# Patient Record
Sex: FEMALE | Race: WHITE | NOT HISPANIC OR LATINO | Employment: OTHER | URBAN - NONMETROPOLITAN AREA
[De-identification: names, ages, dates, MRNs, and addresses within clinical notes are randomized per-mention and may not be internally consistent; named-entity substitution may affect disease eponyms.]

---

## 2023-04-21 LAB
CHOLESTEROL (MG/DL) IN SER/PLAS: 208 MG/DL (ref 0–199)
CHOLESTEROL IN HDL (MG/DL) IN SER/PLAS: 87.4 MG/DL
CHOLESTEROL/HDL RATIO: 2.4
LDL: 108 MG/DL (ref 0–99)
TRIGLYCERIDE (MG/DL) IN SER/PLAS: 63 MG/DL (ref 0–149)
VLDL: 13 MG/DL (ref 0–40)

## 2023-04-28 PROBLEM — K58.9 IRRITABLE BOWEL SYNDROME: Status: ACTIVE | Noted: 2023-04-28

## 2023-04-28 PROBLEM — I10 HYPERTENSION: Status: ACTIVE | Noted: 2023-04-28

## 2023-04-28 PROBLEM — G47.00 PERSISTENT INSOMNIA: Status: ACTIVE | Noted: 2023-04-28

## 2023-04-28 PROBLEM — I47.10 PSVT (PAROXYSMAL SUPRAVENTRICULAR TACHYCARDIA) (CMS-HCC): Status: ACTIVE | Noted: 2023-04-28

## 2023-04-28 PROBLEM — I34.1 MITRAL VALVE PROLAPSE: Status: ACTIVE | Noted: 2023-04-28

## 2023-04-28 PROBLEM — M81.0 OSTEOPOROSIS: Status: ACTIVE | Noted: 2023-04-28

## 2023-04-28 PROBLEM — D72.819 LEUKOPENIA: Status: ACTIVE | Noted: 2023-04-28

## 2023-04-28 RX ORDER — VALSARTAN 160 MG/1
1 TABLET ORAL DAILY
COMMUNITY
Start: 2017-09-26 | End: 2023-05-01 | Stop reason: SDUPTHER

## 2023-05-01 ENCOUNTER — OFFICE VISIT (OUTPATIENT)
Dept: PRIMARY CARE | Facility: CLINIC | Age: 68
End: 2023-05-01
Payer: MEDICARE

## 2023-05-01 VITALS
TEMPERATURE: 97.2 F | HEART RATE: 72 BPM | DIASTOLIC BLOOD PRESSURE: 78 MMHG | BODY MASS INDEX: 19.94 KG/M2 | OXYGEN SATURATION: 97 % | WEIGHT: 110.8 LBS | SYSTOLIC BLOOD PRESSURE: 140 MMHG

## 2023-05-01 DIAGNOSIS — K58.9 IRRITABLE BOWEL SYNDROME, UNSPECIFIED TYPE: ICD-10-CM

## 2023-05-01 DIAGNOSIS — E78.00 HYPERCHOLESTEREMIA: ICD-10-CM

## 2023-05-01 DIAGNOSIS — Z79.899 HIGH RISK MEDICATION USE: ICD-10-CM

## 2023-05-01 DIAGNOSIS — I10 HYPERTENSION, UNSPECIFIED TYPE: ICD-10-CM

## 2023-05-01 DIAGNOSIS — I47.10 PSVT (PAROXYSMAL SUPRAVENTRICULAR TACHYCARDIA) (CMS-HCC): ICD-10-CM

## 2023-05-01 DIAGNOSIS — I34.1 MITRAL VALVE PROLAPSE: ICD-10-CM

## 2023-05-01 DIAGNOSIS — Z12.31 ENCOUNTER FOR SCREENING MAMMOGRAM FOR MALIGNANT NEOPLASM OF BREAST: Primary | ICD-10-CM

## 2023-05-01 DIAGNOSIS — R53.83 OTHER FATIGUE: ICD-10-CM

## 2023-05-01 PROBLEM — E46 PROTEIN-CALORIE MALNUTRITION, UNSPECIFIED SEVERITY (MULTI): Status: ACTIVE | Noted: 2023-05-01

## 2023-05-01 PROBLEM — E46 PROTEIN-CALORIE MALNUTRITION, UNSPECIFIED SEVERITY (MULTI): Status: RESOLVED | Noted: 2023-05-01 | Resolved: 2023-05-01

## 2023-05-01 PROCEDURE — 3078F DIAST BP <80 MM HG: CPT | Performed by: INTERNAL MEDICINE

## 2023-05-01 PROCEDURE — 1036F TOBACCO NON-USER: CPT | Performed by: INTERNAL MEDICINE

## 2023-05-01 PROCEDURE — 99214 OFFICE O/P EST MOD 30 MIN: CPT | Performed by: INTERNAL MEDICINE

## 2023-05-01 PROCEDURE — 3077F SYST BP >= 140 MM HG: CPT | Performed by: INTERNAL MEDICINE

## 2023-05-01 PROCEDURE — 1160F RVW MEDS BY RX/DR IN RCRD: CPT | Performed by: INTERNAL MEDICINE

## 2023-05-01 PROCEDURE — 1159F MED LIST DOCD IN RCRD: CPT | Performed by: INTERNAL MEDICINE

## 2023-05-01 RX ORDER — DICYCLOMINE HYDROCHLORIDE 20 MG/1
TABLET ORAL
Qty: 90 TABLET | Refills: 1 | Status: SHIPPED | OUTPATIENT
Start: 2023-05-01 | End: 2023-11-01 | Stop reason: SDUPTHER

## 2023-05-01 RX ORDER — VALSARTAN 160 MG/1
160 TABLET ORAL DAILY
Qty: 90 TABLET | Refills: 1 | Status: SHIPPED | OUTPATIENT
Start: 2023-05-01 | End: 2023-11-01 | Stop reason: SDUPTHER

## 2023-05-01 RX ORDER — DICYCLOMINE HYDROCHLORIDE 20 MG/1
TABLET ORAL
COMMUNITY
Start: 2023-03-06 | End: 2023-05-01 | Stop reason: SDUPTHER

## 2023-05-01 ASSESSMENT — ENCOUNTER SYMPTOMS
DIZZINESS: 0
FATIGUE: 0
SINUS PAIN: 0
COUGH: 0
HEADACHES: 0
WHEEZING: 0
BRUISES/BLEEDS EASILY: 0
DIARRHEA: 0
ARTHRALGIAS: 0
HYPERTENSION: 1
FEVER: 0
SORE THROAT: 0
PALPITATIONS: 0
ABDOMINAL PAIN: 0
BLOOD IN STOOL: 0
UNEXPECTED WEIGHT CHANGE: 0
DIFFICULTY URINATING: 0

## 2023-05-01 NOTE — PROGRESS NOTES
Subjective   Patient ID: Olga Lidia Valentin is a 67 y.o. female who presents for Results (Bloodwork) and Hypertension.    - Blood work reviewed  - Lipid profile improving continue low-fat diet  Patient drinking dry  Every night comes about moderation 3 glasses/week  -Screening for colon cancer obtained in November 2020 2 repeat in 5 years 2027 due to family history cancer  -Hypertension improved continue with valsartan  -  Irritable bowel syndrome continue with dicyclomine as needed high-fiber diet  - Osteoporosis continue with calcium and vitamin D exercise weightbearing exercises  - History of mitral valve prolapse palpitation stable no recurrence  Follow-up 6 months  MCR      Hypertension  Pertinent negatives include no chest pain, headaches or palpitations.          Review of Systems   Constitutional:  Negative for fatigue, fever and unexpected weight change.   HENT:  Negative for congestion, ear discharge, ear pain, mouth sores, sinus pain and sore throat.    Eyes:  Negative for visual disturbance.   Respiratory:  Negative for cough and wheezing.    Cardiovascular:  Negative for chest pain, palpitations and leg swelling.   Gastrointestinal:  Negative for abdominal pain, blood in stool and diarrhea.   Genitourinary:  Negative for difficulty urinating.   Musculoskeletal:  Negative for arthralgias.   Skin:  Negative for rash.   Neurological:  Negative for dizziness and headaches.   Hematological:  Does not bruise/bleed easily.   Psychiatric/Behavioral:  Negative for behavioral problems.    All other systems reviewed and are negative.      Objective   No results found for: HGBA1C   /78   Pulse 72   Temp 36.2 °C (97.2 °F)   Wt 50.3 kg (110 lb 12.8 oz)   SpO2 97%   BMI 19.94 kg/m²   Lab Results   Component Value Date    WBC 3.6 (L) 10/27/2022    HGB 14.1 10/27/2022    HCT 43.2 10/27/2022     10/27/2022    CHOL 208 (H) 04/21/2023    TRIG 63 04/21/2023    HDL 87.4 04/21/2023    ALT 17 10/27/2022     AST 25 10/27/2022     10/27/2022    K 4.0 10/27/2022     10/27/2022    CREATININE 0.78 10/27/2022    BUN 16 10/27/2022    CO2 29 10/27/2022    TSH 1.11 10/27/2022     par   Physical Exam  Vitals and nursing note reviewed.   Constitutional:       Appearance: Normal appearance.   HENT:      Head: Normocephalic.      Nose: Nose normal.   Eyes:      Conjunctiva/sclera: Conjunctivae normal.      Pupils: Pupils are equal, round, and reactive to light.   Cardiovascular:      Rate and Rhythm: Regular rhythm.   Pulmonary:      Effort: Pulmonary effort is normal.      Breath sounds: Normal breath sounds.   Abdominal:      General: Abdomen is flat.      Palpations: Abdomen is soft.   Musculoskeletal:      Cervical back: Neck supple.   Skin:     General: Skin is warm.   Neurological:      General: No focal deficit present.      Mental Status: She is oriented to person, place, and time.   Psychiatric:         Mood and Affect: Mood normal.         Assessment/Plan   Olga Lidia was seen today for results and hypertension.  Diagnoses and all orders for this visit:  Encounter for screening mammogram for malignant neoplasm of breast (Primary)  -     BI mammo bilateral screening tomosynthesis; Future  Irritable bowel syndrome, unspecified type  -     dicyclomine (Bentyl) 20 mg tablet; TAKE 1/2 (ONE-HALF) TABLET BY MOUTH TWICE DAILY AS NEEDED FOR BOWEL SPASM.  Hypertension, unspecified type  -     valsartan (Diovan) 160 mg tablet; Take 1 tablet (160 mg) by mouth once daily.  -     Comprehensive Metabolic Panel; Future  High risk medication use  -     CBC and Auto Differential; Future  Hypercholesteremia  -     Lipid Panel; Future  Other fatigue  -     TSH with reflex to Free T4 if abnormal; Future  PSVT (paroxysmal supraventricular tachycardia) (CMS/HCC)  Mitral valve prolapse  Other orders  -     Follow Up In Primary Care; Future   - Blood work reviewed  - Lipid profile improving continue low-fat diet  Patient drinking  dry  Every night comes about moderation 3 glasses/week  -Screening for colon cancer obtained in November 2020 2 repeat in 5 years 2027 due to family history cancer  -Hypertension improved continue with valsartan  -  Irritable bowel syndrome continue with dicyclomine as needed high-fiber diet  - Osteoporosis continue with calcium and vitamin D exercise weightbearing exercises  - History of mitral valve prolapse palpitation stable no recurrence  Follow-up 6 months  MCR

## 2023-10-06 ENCOUNTER — TELEMEDICINE (OUTPATIENT)
Dept: PRIMARY CARE | Facility: CLINIC | Age: 68
End: 2023-10-06
Payer: MEDICARE

## 2023-10-06 DIAGNOSIS — J01.90 ACUTE NON-RECURRENT SINUSITIS, UNSPECIFIED LOCATION: ICD-10-CM

## 2023-10-06 PROCEDURE — 99213 OFFICE O/P EST LOW 20 MIN: CPT | Performed by: NURSE PRACTITIONER

## 2023-10-06 RX ORDER — AZITHROMYCIN 250 MG/1
TABLET, FILM COATED ORAL
Qty: 6 TABLET | Refills: 0 | Status: SHIPPED | OUTPATIENT
Start: 2023-10-06 | End: 2023-11-01 | Stop reason: WASHOUT

## 2023-10-06 ASSESSMENT — ENCOUNTER SYMPTOMS
SORE THROAT: 0
PALPITATIONS: 0
DIZZINESS: 0
SINUS PAIN: 0
HEMATOLOGIC/LYMPHATIC NEGATIVE: 1
NUMBNESS: 0
CONSTIPATION: 0
PSYCHIATRIC NEGATIVE: 1
ABDOMINAL PAIN: 0
LIGHT-HEADEDNESS: 0
ABDOMINAL DISTENTION: 0
DIARRHEA: 0
CHILLS: 1
MUSCULOSKELETAL NEGATIVE: 1
CHEST TIGHTNESS: 0
WHEEZING: 0
FEVER: 0
VOMITING: 0
ENDOCRINE NEGATIVE: 1
RHINORRHEA: 1
SINUS PRESSURE: 0
ALLERGIC/IMMUNOLOGIC NEGATIVE: 1
ACTIVITY CHANGE: 0
NAUSEA: 0
WEAKNESS: 0
COUGH: 1
FATIGUE: 1
HEADACHES: 0
EYES NEGATIVE: 1
SHORTNESS OF BREATH: 0

## 2023-10-06 NOTE — PROGRESS NOTES
Subjective   Patient ID: Olga Lidia Valentin is a 68 y.o. female who presents for Cough (sick for about 6 days coughing up green mucus, has had a fever, covid neg. Coughing kept her up at night.).    Virtual or Telephone Consent    An interactive audio and video telecommunication system which permits real time communications between the patient (at the originating site) and provider (at the distant site) was utilized to provide this telehealth service.   Verbal consent was requested and obtained from Olga Lidia Valentin on this date, 10/06/23 for a telehealth visit.     Patient of Dr. REDDY Johnson. This is the first time I have seen this patient.    Patient with complaint of 5 or 6 days of sneezing, runny nose, sinus congestion, chills, cough, fatigue.  Had chills but thermometer did not show fever.  Tested negative for COVID at home twice.  Coughing up yellowish-green sputum.  Just started taking Mucinex for cough.  Start Z-Terrell for sinusitis. Stay well hydrated, rest. Instructed to call the office if symptoms worsen or do not improve. Tylenol 650 mg every 8 hours as needed for pain or fever. OTC Robitussin or Mucinex according to package directions as needed for cough.          Review of Systems   Constitutional:  Positive for chills and fatigue. Negative for activity change and fever.   HENT:  Positive for congestion, rhinorrhea and sneezing. Negative for sinus pressure, sinus pain and sore throat.    Eyes: Negative.    Respiratory:  Positive for cough. Negative for chest tightness, shortness of breath and wheezing.    Cardiovascular:  Negative for chest pain, palpitations and leg swelling.   Gastrointestinal:  Negative for abdominal distention, abdominal pain, constipation, diarrhea, nausea and vomiting.   Endocrine: Negative.    Genitourinary: Negative.    Musculoskeletal: Negative.    Skin: Negative.    Allergic/Immunologic: Negative.    Neurological:  Negative for dizziness, syncope, weakness, light-headedness,  numbness and headaches.   Hematological: Negative.    Psychiatric/Behavioral: Negative.     All other systems reviewed and are negative.      Objective   There were no vitals taken for this visit.    Physical Exam  Neurological:      Mental Status: She is alert.   Psychiatric:         Mood and Affect: Mood normal.         Behavior: Behavior normal.         Thought Content: Thought content normal.         Judgment: Judgment normal.         Assessment/Plan     #Sinusitis  -Start Z-Terrell  -Saline nasal spray as needed  -Tylenol 650 mg every 8 hours as needed for pain  -OTC Robitussin or Mucinex according to package directions as needed for cough  -Drink plenty of fluids  -Get plenty of rest  -Call the office if no improvement or worsens  -Go to the emergency room for worsening symptoms, dyspnea, chest pain    Follow-up with Dr. Johnson as scheduled in November and as needed

## 2023-11-01 ENCOUNTER — OFFICE VISIT (OUTPATIENT)
Dept: PRIMARY CARE | Facility: CLINIC | Age: 68
End: 2023-11-01
Payer: MEDICARE

## 2023-11-01 VITALS
HEIGHT: 63 IN | SYSTOLIC BLOOD PRESSURE: 125 MMHG | WEIGHT: 109.4 LBS | TEMPERATURE: 97.8 F | HEART RATE: 49 BPM | BODY MASS INDEX: 19.38 KG/M2 | OXYGEN SATURATION: 99 % | DIASTOLIC BLOOD PRESSURE: 70 MMHG

## 2023-11-01 DIAGNOSIS — Z13.89 SCREENING FOR MULTIPLE CONDITIONS: ICD-10-CM

## 2023-11-01 DIAGNOSIS — I47.10 PSVT (PAROXYSMAL SUPRAVENTRICULAR TACHYCARDIA) (CMS-HCC): ICD-10-CM

## 2023-11-01 DIAGNOSIS — R53.83 OTHER FATIGUE: ICD-10-CM

## 2023-11-01 DIAGNOSIS — Z79.899 HIGH RISK MEDICATION USE: ICD-10-CM

## 2023-11-01 DIAGNOSIS — E55.9 VITAMIN D DEFICIENCY, UNSPECIFIED: ICD-10-CM

## 2023-11-01 DIAGNOSIS — I10 HYPERTENSION, UNSPECIFIED TYPE: ICD-10-CM

## 2023-11-01 DIAGNOSIS — Z23 FLU VACCINE NEED: ICD-10-CM

## 2023-11-01 DIAGNOSIS — K58.9 IRRITABLE BOWEL SYNDROME, UNSPECIFIED TYPE: Primary | ICD-10-CM

## 2023-11-01 DIAGNOSIS — Z12.31 ENCOUNTER FOR SCREENING MAMMOGRAM FOR BREAST CANCER: ICD-10-CM

## 2023-11-01 DIAGNOSIS — E53.8 VITAMIN B12 DEFICIENCY: ICD-10-CM

## 2023-11-01 DIAGNOSIS — I34.1 MITRAL VALVE PROLAPSE: ICD-10-CM

## 2023-11-01 DIAGNOSIS — Z78.0 ASYMPTOMATIC MENOPAUSAL STATE: ICD-10-CM

## 2023-11-01 DIAGNOSIS — E78.00 HYPERCHOLESTEREMIA: ICD-10-CM

## 2023-11-01 DIAGNOSIS — Z00.00 ROUTINE GENERAL MEDICAL EXAMINATION AT HEALTH CARE FACILITY: ICD-10-CM

## 2023-11-01 PROCEDURE — G0439 PPPS, SUBSEQ VISIT: HCPCS | Performed by: INTERNAL MEDICINE

## 2023-11-01 PROCEDURE — G0008 ADMIN INFLUENZA VIRUS VAC: HCPCS | Performed by: INTERNAL MEDICINE

## 2023-11-01 PROCEDURE — 1170F FXNL STATUS ASSESSED: CPT | Performed by: INTERNAL MEDICINE

## 2023-11-01 PROCEDURE — 3074F SYST BP LT 130 MM HG: CPT | Performed by: INTERNAL MEDICINE

## 2023-11-01 PROCEDURE — 1036F TOBACCO NON-USER: CPT | Performed by: INTERNAL MEDICINE

## 2023-11-01 PROCEDURE — 3078F DIAST BP <80 MM HG: CPT | Performed by: INTERNAL MEDICINE

## 2023-11-01 PROCEDURE — 1159F MED LIST DOCD IN RCRD: CPT | Performed by: INTERNAL MEDICINE

## 2023-11-01 PROCEDURE — 99214 OFFICE O/P EST MOD 30 MIN: CPT | Performed by: INTERNAL MEDICINE

## 2023-11-01 PROCEDURE — 1160F RVW MEDS BY RX/DR IN RCRD: CPT | Performed by: INTERNAL MEDICINE

## 2023-11-01 PROCEDURE — 90662 IIV NO PRSV INCREASED AG IM: CPT | Performed by: INTERNAL MEDICINE

## 2023-11-01 RX ORDER — VALSARTAN 160 MG/1
160 TABLET ORAL DAILY
Qty: 90 TABLET | Refills: 1 | Status: SHIPPED | OUTPATIENT
Start: 2023-11-01 | End: 2024-05-01 | Stop reason: SDUPTHER

## 2023-11-01 RX ORDER — DICYCLOMINE HYDROCHLORIDE 20 MG/1
TABLET ORAL
Qty: 90 TABLET | Refills: 1 | Status: SHIPPED | OUTPATIENT
Start: 2023-11-01 | End: 2024-05-01 | Stop reason: SDUPTHER

## 2023-11-01 ASSESSMENT — ENCOUNTER SYMPTOMS
PALPITATIONS: 0
SORE THROAT: 0
LOSS OF SENSATION IN FEET: 0
UNEXPECTED WEIGHT CHANGE: 0
DIARRHEA: 0
OCCASIONAL FEELINGS OF UNSTEADINESS: 0
ARTHRALGIAS: 0
WHEEZING: 0
FATIGUE: 0
BLOOD IN STOOL: 0
COUGH: 0
DEPRESSION: 0
BRUISES/BLEEDS EASILY: 0
HEADACHES: 0
ABDOMINAL PAIN: 0
SINUS PAIN: 0
DIZZINESS: 0
DIFFICULTY URINATING: 0
FEVER: 0

## 2023-11-01 ASSESSMENT — ACTIVITIES OF DAILY LIVING (ADL)
MANAGING_FINANCES: INDEPENDENT
TAKING_MEDICATION: INDEPENDENT
DRESSING: INDEPENDENT
GROCERY_SHOPPING: INDEPENDENT
DOING_HOUSEWORK: INDEPENDENT
BATHING: INDEPENDENT

## 2023-11-01 ASSESSMENT — PATIENT HEALTH QUESTIONNAIRE - PHQ9
2. FEELING DOWN, DEPRESSED OR HOPELESS: NOT AT ALL
1. LITTLE INTEREST OR PLEASURE IN DOING THINGS: NOT AT ALL
SUM OF ALL RESPONSES TO PHQ9 QUESTIONS 1 AND 2: 0

## 2023-11-01 NOTE — PROGRESS NOTES
"Subjective   Patient ID: Olga Lidia Valentin is a 68 y.o. female who presents for Medicare Annual Wellness Visit Subsequent and Flu Vaccine (Vaccine given).    Annual preventive visit  - Vaccinations reviewed and up-to-date  High-dose flu vaccine given today  - Screening for colon cancer obtained November 2020 2 repeat in November 2027 5 years  - Screening mammogram up-to-date repeat in May 2024  --History of osteoporosis patient declined treatment due to side effect of medication on calcium and vitamin D  Patient lost about half inch for height need to proceed with repeat bone density for further recommendation 6-month  Medical screening reviewed    Follow-up  - Mitral valve prolapse and COPD stable no recurrence no shortness of breath no palpitation no leg swelling no orthopnea  - Osteoporosis continue with calcium and vitamin D weightbearing exercises healthy diet calcium and vitamin D  -Hypertension improved continue with valsartan  -  Irritable bowel syndrome continue with dicyclomine as needed high-fiber diet symptoms are controlled  Follow-up 6 months           Review of Systems   Constitutional:  Negative for fatigue, fever and unexpected weight change.   HENT:  Negative for congestion, ear discharge, ear pain, mouth sores, sinus pain and sore throat.    Eyes:  Negative for visual disturbance.   Respiratory:  Negative for cough and wheezing.    Cardiovascular:  Negative for chest pain, palpitations and leg swelling.   Gastrointestinal:  Negative for abdominal pain, blood in stool and diarrhea.   Genitourinary:  Negative for difficulty urinating.   Musculoskeletal:  Negative for arthralgias.   Skin:  Negative for rash.   Neurological:  Negative for dizziness and headaches.   Hematological:  Does not bruise/bleed easily.   Psychiatric/Behavioral:  Negative for behavioral problems.    All other systems reviewed and are negative.      Objective   No results found for: \"HGBA1C\"   /70   Pulse (!) 49   Temp " "36.6 °C (97.8 °F)   Ht 1.588 m (5' 2.5\")   Wt 49.6 kg (109 lb 6.4 oz)   SpO2 99%   BMI 19.69 kg/m²   Lab Results   Component Value Date    WBC 3.6 (L) 10/27/2022    HGB 14.1 10/27/2022    HCT 43.2 10/27/2022     10/27/2022    CHOL 208 (H) 04/21/2023    TRIG 63 04/21/2023    HDL 87.4 04/21/2023    ALT 17 10/27/2022    AST 25 10/27/2022     10/27/2022    K 4.0 10/27/2022     10/27/2022    CREATININE 0.78 10/27/2022    BUN 16 10/27/2022    CO2 29 10/27/2022    TSH 1.11 10/27/2022     par   Physical Exam  Vitals and nursing note reviewed.   Constitutional:       Appearance: Normal appearance.   HENT:      Head: Normocephalic.      Nose: Nose normal.   Eyes:      Conjunctiva/sclera: Conjunctivae normal.      Pupils: Pupils are equal, round, and reactive to light.   Cardiovascular:      Rate and Rhythm: Regular rhythm.   Pulmonary:      Effort: Pulmonary effort is normal.      Breath sounds: Normal breath sounds.   Abdominal:      General: Abdomen is flat.      Palpations: Abdomen is soft.   Musculoskeletal:      Cervical back: Neck supple.   Skin:     General: Skin is warm.   Neurological:      General: No focal deficit present.      Mental Status: She is oriented to person, place, and time.   Psychiatric:         Mood and Affect: Mood normal.         Assessment/Plan   Olga Lidia was seen today for medicare annual wellness visit subsequent and flu vaccine.  Diagnoses and all orders for this visit:  Irritable bowel syndrome, unspecified type (Primary)  -     dicyclomine (Bentyl) 20 mg tablet; TAKE 1/2 (ONE-HALF) TABLET BY MOUTH TWICE DAILY AS NEEDED FOR BOWEL SPASM.  Hypertension, unspecified type  -     valsartan (Diovan) 160 mg tablet; Take 1 tablet (160 mg) by mouth once daily.  -     Comprehensive Metabolic Panel; Future  Flu vaccine need  -     Flu vaccine, quadrivalent, high-dose, preservative free, age 65y+ (FLUZONE)  Screening for multiple conditions  Asymptomatic menopausal state  -     XR " DEXA bone density; Future  Encounter for screening mammogram for breast cancer  -     BI mammo bilateral screening tomosynthesis; Future  Routine general medical examination at health care facility  High risk medication use  -     CBC and Auto Differential; Future  Hypercholesteremia  -     Lipid Panel; Future  Other fatigue  -     TSH with reflex to Free T4 if abnormal; Future  Vitamin B12 deficiency  -     Vitamin B12; Future  Vitamin D deficiency, unspecified  -     Vitamin D 25-Hydroxy,Total (for eval of Vitamin D levels); Future  PSVT (paroxysmal supraventricular tachycardia)  Mitral valve prolapse  Other orders  -     Follow Up In Primary Care  -     Follow Up In Primary Care - Established; Future   Annual preventive visit  - Vaccinations reviewed and up-to-date  High-dose flu vaccine given today  - Screening for colon cancer obtained November 2020 2 repeat in November 2027 5 years  - Screening mammogram up-to-date repeat in May 2024  --History of osteoporosis patient declined treatment due to side effect of medication on calcium and vitamin D  Patient lost about half inch for height need to proceed with repeat bone density for further recommendation 6-month  Medical screening reviewed    Follow-up  - Mitral valve prolapse and COPD stable no recurrence no shortness of breath no palpitation no leg swelling no orthopnea  - Osteoporosis continue with calcium and vitamin D weightbearing exercises healthy diet calcium and vitamin D  -Hypertension improved continue with valsartan  -  Irritable bowel syndrome continue with dicyclomine as needed high-fiber diet symptoms are controlled  Follow-up 6 months

## 2023-11-14 ENCOUNTER — HOSPITAL ENCOUNTER (OUTPATIENT)
Dept: RADIOLOGY | Facility: HOSPITAL | Age: 68
Discharge: HOME | End: 2023-11-14
Payer: MEDICARE

## 2023-11-14 ENCOUNTER — OFFICE VISIT (OUTPATIENT)
Dept: PRIMARY CARE | Facility: CLINIC | Age: 68
End: 2023-11-14
Payer: MEDICARE

## 2023-11-14 VITALS
SYSTOLIC BLOOD PRESSURE: 150 MMHG | DIASTOLIC BLOOD PRESSURE: 90 MMHG | HEART RATE: 72 BPM | OXYGEN SATURATION: 100 % | BODY MASS INDEX: 19.73 KG/M2 | TEMPERATURE: 96.2 F | WEIGHT: 109.6 LBS

## 2023-11-14 DIAGNOSIS — M79.605 PAIN OF LEFT LOWER EXTREMITY: Primary | ICD-10-CM

## 2023-11-14 DIAGNOSIS — I10 HYPERTENSION, UNSPECIFIED TYPE: ICD-10-CM

## 2023-11-14 DIAGNOSIS — Z12.31 ENCOUNTER FOR SCREENING MAMMOGRAM FOR BREAST CANCER: ICD-10-CM

## 2023-11-14 DIAGNOSIS — M79.605 PAIN OF LEFT LOWER EXTREMITY: ICD-10-CM

## 2023-11-14 DIAGNOSIS — I34.1 MITRAL VALVE PROLAPSE: ICD-10-CM

## 2023-11-14 PROCEDURE — 3077F SYST BP >= 140 MM HG: CPT | Performed by: INTERNAL MEDICINE

## 2023-11-14 PROCEDURE — 77067 SCR MAMMO BI INCL CAD: CPT

## 2023-11-14 PROCEDURE — 1160F RVW MEDS BY RX/DR IN RCRD: CPT | Performed by: INTERNAL MEDICINE

## 2023-11-14 PROCEDURE — 1159F MED LIST DOCD IN RCRD: CPT | Performed by: INTERNAL MEDICINE

## 2023-11-14 PROCEDURE — 99214 OFFICE O/P EST MOD 30 MIN: CPT | Performed by: INTERNAL MEDICINE

## 2023-11-14 PROCEDURE — 3080F DIAST BP >= 90 MM HG: CPT | Performed by: INTERNAL MEDICINE

## 2023-11-14 PROCEDURE — 1036F TOBACCO NON-USER: CPT | Performed by: INTERNAL MEDICINE

## 2023-11-14 PROCEDURE — 77067 SCR MAMMO BI INCL CAD: CPT | Performed by: RADIOLOGY

## 2023-11-14 PROCEDURE — 93971 EXTREMITY STUDY: CPT

## 2023-11-14 PROCEDURE — 77063 BREAST TOMOSYNTHESIS BI: CPT

## 2023-11-14 PROCEDURE — 77063 BREAST TOMOSYNTHESIS BI: CPT | Performed by: RADIOLOGY

## 2023-11-14 ASSESSMENT — ENCOUNTER SYMPTOMS
ABDOMINAL PAIN: 0
WHEEZING: 0
DIARRHEA: 0
HEADACHES: 0
BLOOD IN STOOL: 0
UNEXPECTED WEIGHT CHANGE: 0
COUGH: 0
ARTHRALGIAS: 1
SINUS PAIN: 0
FATIGUE: 0
SORE THROAT: 0
PALPITATIONS: 0
FEVER: 0
DIZZINESS: 0
BRUISES/BLEEDS EASILY: 0
DIFFICULTY URINATING: 0

## 2023-11-14 NOTE — PROGRESS NOTES
Subjective   Patient ID: Olga Lidia Valentin is a 68 y.o. female who presents for Leg Pain (Left leg pain, started in knee then went to calf, x 1 month, weakness).    - Left leg pain on and off for the last months physical exam tenderness in the left calf  Stat ultrasound showed no DVT  Patient counseled about results musculoskeletal pain May use Motrin as needed follow-up if no improvement  - Screening for colon cancer obtained November 2020 2 repeat in November 2027 5 years  - Screening mammogram up-to-date   --History of osteoporosis patient declined treatment due to side effect of medication on calcium and vitamin D  Patient lost about half inch for height need to proceed with repeat bone density for further recommendation 6-month  Medical screening reviewed  - Mitral valve prolapse and COPD stable no recurrence no shortness of breath no palpitation no leg swelling no orthopnea  - Osteoporosis continue with calcium and vitamin D weightbearing exercises healthy diet calcium and vitamin D  -Hypertension improved continue with valsartan  -  Irritable bowel syndrome continue with dicyclomine as needed high-fiber diet symptoms are controlled  Follow-up 6 months, as a scheduled                Review of Systems   Constitutional:  Negative for fatigue, fever and unexpected weight change.   HENT:  Negative for congestion, ear discharge, ear pain, mouth sores, sinus pain and sore throat.    Eyes:  Negative for visual disturbance.   Respiratory:  Negative for cough and wheezing.    Cardiovascular:  Negative for chest pain, palpitations and leg swelling.   Gastrointestinal:  Negative for abdominal pain, blood in stool and diarrhea.   Genitourinary:  Negative for difficulty urinating.   Musculoskeletal:  Positive for arthralgias.   Skin:  Negative for rash.   Neurological:  Negative for dizziness and headaches.   Hematological:  Does not bruise/bleed easily.   Psychiatric/Behavioral:  Negative for behavioral problems.    All  "other systems reviewed and are negative.      Objective   No results found for: \"HGBA1C\"   /90   Pulse 72   Temp 35.7 °C (96.2 °F)   Wt 49.7 kg (109 lb 9.6 oz)   SpO2 100%   BMI 19.73 kg/m²     Physical Exam  Vitals and nursing note reviewed.   Constitutional:       Appearance: Normal appearance.   HENT:      Head: Normocephalic.      Nose: Nose normal.   Eyes:      Conjunctiva/sclera: Conjunctivae normal.      Pupils: Pupils are equal, round, and reactive to light.   Cardiovascular:      Rate and Rhythm: Regular rhythm.   Pulmonary:      Effort: Pulmonary effort is normal.      Breath sounds: Normal breath sounds.   Abdominal:      General: Abdomen is flat.      Palpations: Abdomen is soft.   Musculoskeletal:         General: Tenderness (Left leg swelling and tenderness) present.      Cervical back: Neck supple.   Skin:     General: Skin is warm.   Neurological:      General: No focal deficit present.      Mental Status: She is oriented to person, place, and time.   Psychiatric:         Mood and Affect: Mood normal.         Assessment/Plan   Olga Lidia was seen today for leg pain.  Diagnoses and all orders for this visit:  Pain of left lower extremity (Primary)  -     Lower extremity venous duplex left; Future  Hypertension, unspecified type  Mitral valve prolapse   - Left leg pain on and off for the last months physical exam tenderness in the left calf  Stat ultrasound showed no DVT  Patient counseled about results musculoskeletal pain May use Motrin as needed follow-up if no improvement  - Screening for colon cancer obtained November 2020 2 repeat in November 2027 5 years  - Screening mammogram up-to-date   --History of osteoporosis patient declined treatment due to side effect of medication on calcium and vitamin D  Patient lost about half inch for height need to proceed with repeat bone density for further recommendation 6-month  Medical screening reviewed  - Mitral valve prolapse and COPD stable no " recurrence no shortness of breath no palpitation no leg swelling no orthopnea  - Osteoporosis continue with calcium and vitamin D weightbearing exercises healthy diet calcium and vitamin D  -Hypertension improved continue with valsartan  -  Irritable bowel syndrome continue with dicyclomine as needed high-fiber diet symptoms are controlled  Follow-up 6 months, as a scheduled

## 2024-04-02 ENCOUNTER — HOSPITAL ENCOUNTER (OUTPATIENT)
Dept: RADIOLOGY | Facility: HOSPITAL | Age: 69
Discharge: HOME | End: 2024-04-02
Payer: MEDICARE

## 2024-04-02 DIAGNOSIS — Z78.0 ASYMPTOMATIC MENOPAUSAL STATE: ICD-10-CM

## 2024-04-02 PROCEDURE — 77080 DXA BONE DENSITY AXIAL: CPT

## 2024-04-26 ENCOUNTER — LAB (OUTPATIENT)
Dept: LAB | Facility: LAB | Age: 69
End: 2024-04-26
Payer: MEDICARE

## 2024-04-26 DIAGNOSIS — I10 HYPERTENSION, UNSPECIFIED TYPE: ICD-10-CM

## 2024-04-26 DIAGNOSIS — E55.9 VITAMIN D DEFICIENCY, UNSPECIFIED: ICD-10-CM

## 2024-04-26 DIAGNOSIS — E53.8 VITAMIN B12 DEFICIENCY: ICD-10-CM

## 2024-04-26 DIAGNOSIS — E78.00 HYPERCHOLESTEREMIA: ICD-10-CM

## 2024-04-26 DIAGNOSIS — R53.83 OTHER FATIGUE: ICD-10-CM

## 2024-04-26 DIAGNOSIS — Z79.899 HIGH RISK MEDICATION USE: ICD-10-CM

## 2024-04-26 LAB
25(OH)D3 SERPL-MCNC: 42 NG/ML (ref 30–100)
ALBUMIN SERPL BCP-MCNC: 4.4 G/DL (ref 3.4–5)
ALP SERPL-CCNC: 66 U/L (ref 33–136)
ALT SERPL W P-5'-P-CCNC: 17 U/L (ref 7–45)
ANION GAP SERPL CALC-SCNC: 10 MMOL/L (ref 10–20)
AST SERPL W P-5'-P-CCNC: 21 U/L (ref 9–39)
BASOPHILS # BLD AUTO: 0.07 X10*3/UL (ref 0–0.1)
BASOPHILS NFR BLD AUTO: 2.1 %
BILIRUB SERPL-MCNC: 0.6 MG/DL (ref 0–1.2)
BUN SERPL-MCNC: 13 MG/DL (ref 6–23)
CALCIUM SERPL-MCNC: 9.6 MG/DL (ref 8.6–10.6)
CHLORIDE SERPL-SCNC: 105 MMOL/L (ref 98–107)
CHOLEST SERPL-MCNC: 222 MG/DL (ref 0–199)
CHOLESTEROL/HDL RATIO: 2.6
CO2 SERPL-SCNC: 32 MMOL/L (ref 21–32)
CREAT SERPL-MCNC: 0.83 MG/DL (ref 0.5–1.05)
EGFRCR SERPLBLD CKD-EPI 2021: 78 ML/MIN/1.73M*2
EOSINOPHIL # BLD AUTO: 0.06 X10*3/UL (ref 0–0.7)
EOSINOPHIL NFR BLD AUTO: 1.8 %
ERYTHROCYTE [DISTWIDTH] IN BLOOD BY AUTOMATED COUNT: 12.2 % (ref 11.5–14.5)
GLUCOSE SERPL-MCNC: 84 MG/DL (ref 74–99)
HCT VFR BLD AUTO: 40.8 % (ref 36–46)
HDLC SERPL-MCNC: 84.4 MG/DL
HGB BLD-MCNC: 13.7 G/DL (ref 12–16)
IMM GRANULOCYTES # BLD AUTO: 0 X10*3/UL (ref 0–0.7)
IMM GRANULOCYTES NFR BLD AUTO: 0 % (ref 0–0.9)
LDLC SERPL CALC-MCNC: 123 MG/DL
LYMPHOCYTES # BLD AUTO: 1.6 X10*3/UL (ref 1.2–4.8)
LYMPHOCYTES NFR BLD AUTO: 48.6 %
MCH RBC QN AUTO: 31.4 PG (ref 26–34)
MCHC RBC AUTO-ENTMCNC: 33.6 G/DL (ref 32–36)
MCV RBC AUTO: 94 FL (ref 80–100)
MONOCYTES # BLD AUTO: 0.26 X10*3/UL (ref 0.1–1)
MONOCYTES NFR BLD AUTO: 7.9 %
NEUTROPHILS # BLD AUTO: 1.3 X10*3/UL (ref 1.2–7.7)
NEUTROPHILS NFR BLD AUTO: 39.6 %
NON HDL CHOLESTEROL: 138 MG/DL (ref 0–149)
NRBC BLD-RTO: 0 /100 WBCS (ref 0–0)
PLATELET # BLD AUTO: 201 X10*3/UL (ref 150–450)
POTASSIUM SERPL-SCNC: 4.1 MMOL/L (ref 3.5–5.3)
PROT SERPL-MCNC: 6.7 G/DL (ref 6.4–8.2)
RBC # BLD AUTO: 4.36 X10*6/UL (ref 4–5.2)
SODIUM SERPL-SCNC: 143 MMOL/L (ref 136–145)
TRIGL SERPL-MCNC: 75 MG/DL (ref 0–149)
TSH SERPL-ACNC: 1.02 MIU/L (ref 0.44–3.98)
VIT B12 SERPL-MCNC: 994 PG/ML (ref 211–911)
VLDL: 15 MG/DL (ref 0–40)
WBC # BLD AUTO: 3.3 X10*3/UL (ref 4.4–11.3)

## 2024-04-26 PROCEDURE — 85025 COMPLETE CBC W/AUTO DIFF WBC: CPT

## 2024-04-26 PROCEDURE — 84443 ASSAY THYROID STIM HORMONE: CPT

## 2024-04-26 PROCEDURE — 82306 VITAMIN D 25 HYDROXY: CPT

## 2024-04-26 PROCEDURE — 80061 LIPID PANEL: CPT

## 2024-04-26 PROCEDURE — 80053 COMPREHEN METABOLIC PANEL: CPT

## 2024-04-26 PROCEDURE — 36415 COLL VENOUS BLD VENIPUNCTURE: CPT

## 2024-04-26 PROCEDURE — 82607 VITAMIN B-12: CPT

## 2024-05-01 ENCOUNTER — OFFICE VISIT (OUTPATIENT)
Dept: PRIMARY CARE | Facility: CLINIC | Age: 69
End: 2024-05-01
Payer: MEDICARE

## 2024-05-01 ENCOUNTER — TELEPHONE (OUTPATIENT)
Dept: HEMATOLOGY/ONCOLOGY | Facility: HOSPITAL | Age: 69
End: 2024-05-01

## 2024-05-01 VITALS
BODY MASS INDEX: 19.44 KG/M2 | DIASTOLIC BLOOD PRESSURE: 80 MMHG | TEMPERATURE: 97.1 F | SYSTOLIC BLOOD PRESSURE: 140 MMHG | WEIGHT: 108 LBS | HEART RATE: 65 BPM | OXYGEN SATURATION: 95 %

## 2024-05-01 DIAGNOSIS — I47.10 PSVT (PAROXYSMAL SUPRAVENTRICULAR TACHYCARDIA) (CMS-HCC): ICD-10-CM

## 2024-05-01 DIAGNOSIS — K58.9 IRRITABLE BOWEL SYNDROME, UNSPECIFIED TYPE: ICD-10-CM

## 2024-05-01 DIAGNOSIS — M81.6 LOCALIZED OSTEOPOROSIS WITHOUT CURRENT PATHOLOGICAL FRACTURE: Primary | ICD-10-CM

## 2024-05-01 DIAGNOSIS — I10 HYPERTENSION, UNSPECIFIED TYPE: ICD-10-CM

## 2024-05-01 PROCEDURE — 3079F DIAST BP 80-89 MM HG: CPT | Performed by: INTERNAL MEDICINE

## 2024-05-01 PROCEDURE — 1036F TOBACCO NON-USER: CPT | Performed by: INTERNAL MEDICINE

## 2024-05-01 PROCEDURE — 3077F SYST BP >= 140 MM HG: CPT | Performed by: INTERNAL MEDICINE

## 2024-05-01 PROCEDURE — 99214 OFFICE O/P EST MOD 30 MIN: CPT | Performed by: INTERNAL MEDICINE

## 2024-05-01 PROCEDURE — 1160F RVW MEDS BY RX/DR IN RCRD: CPT | Performed by: INTERNAL MEDICINE

## 2024-05-01 PROCEDURE — 1159F MED LIST DOCD IN RCRD: CPT | Performed by: INTERNAL MEDICINE

## 2024-05-01 RX ORDER — DICYCLOMINE HYDROCHLORIDE 20 MG/1
TABLET ORAL
Qty: 90 TABLET | Refills: 1 | Status: SHIPPED | OUTPATIENT
Start: 2024-05-01

## 2024-05-01 RX ORDER — FAMOTIDINE 10 MG/ML
20 INJECTION INTRAVENOUS ONCE AS NEEDED
OUTPATIENT
Start: 2024-05-01

## 2024-05-01 RX ORDER — DIPHENHYDRAMINE HYDROCHLORIDE 50 MG/ML
50 INJECTION INTRAMUSCULAR; INTRAVENOUS AS NEEDED
OUTPATIENT
Start: 2024-05-01

## 2024-05-01 RX ORDER — ALBUTEROL SULFATE 0.83 MG/ML
3 SOLUTION RESPIRATORY (INHALATION) AS NEEDED
OUTPATIENT
Start: 2024-05-01

## 2024-05-01 RX ORDER — EPINEPHRINE 0.3 MG/.3ML
0.3 INJECTION SUBCUTANEOUS EVERY 5 MIN PRN
OUTPATIENT
Start: 2024-05-01

## 2024-05-01 RX ORDER — VALSARTAN 160 MG/1
160 TABLET ORAL DAILY
Qty: 90 TABLET | Refills: 1 | Status: SHIPPED | OUTPATIENT
Start: 2024-05-01

## 2024-05-01 ASSESSMENT — ENCOUNTER SYMPTOMS
UNEXPECTED WEIGHT CHANGE: 0
SORE THROAT: 0
SINUS PAIN: 0
FATIGUE: 0
PALPITATIONS: 0
ARTHRALGIAS: 0
DIZZINESS: 0
FEVER: 0
BRUISES/BLEEDS EASILY: 0
DIARRHEA: 0
COUGH: 0
HYPERTENSION: 1
DIFFICULTY URINATING: 0
HEADACHES: 0
BLOOD IN STOOL: 0
WHEEZING: 0
ABDOMINAL PAIN: 0

## 2024-05-01 NOTE — TELEPHONE ENCOUNTER
Received Prolia order, ordered by PCP, Gunnar Johnson MD. Reached out to patient to schedule. This will be patient's first dose. Patient has Medicare Part A and B with [employer] as secondary. Pre-cert NPN. Labs needed prior to appointment. Patient has labs done on 4/26/24. Labs ok to use for scheduled appointment. Scheduled for tomorrow, 5/2/24 at 9:30 AM. Patient verbalized understanding and agreed to appointment.

## 2024-05-01 NOTE — PATIENT INSTRUCTIONS

## 2024-05-01 NOTE — PROGRESS NOTES
Patient was identified as a fall risk. Risk prevention instructions provided.Subjective   Patient ID: Olga Lidia Vlaentin is a 68 y.o. female who presents for Hypertension and Results (BW, DEXA).     - Recent blood work reviewed  - Hypercholesterolemia maximize medical management continue low-fat diet  -Osteoporosis, patient declined treatment in the past lost to have-  Left hip osteoporosis strong family history of osteoporosis in her mother and multiple fractures  Patient counseled about calcium and vitamin D exercise  Started Prolia injection every 6 months for 2 years follow-up bone densitometry in 2 years repeat in 2026  - Screening for colon cancer obtained November 2020 2 repeat in November 2027 5 years  - Screening mammogram up-to-date   -- Mitral valve prolapse and COPD stable no recurrence no shortness of breath no palpitation no leg swelling no orthopnea  --Hypertension improved continue with valsartan  -  Irritable bowel syndrome continue with dicyclomine as needed high-fiber diet symptoms are controlled  Follow-up 6 months, as a scheduled      Hypertension  Pertinent negatives include no chest pain, headaches or palpitations.          Review of Systems   Constitutional:  Negative for fatigue, fever and unexpected weight change.   HENT:  Negative for congestion, ear discharge, ear pain, mouth sores, sinus pain and sore throat.    Eyes:  Negative for visual disturbance.   Respiratory:  Negative for cough and wheezing.    Cardiovascular:  Negative for chest pain, palpitations and leg swelling.   Gastrointestinal:  Negative for abdominal pain, blood in stool and diarrhea.   Genitourinary:  Negative for difficulty urinating.   Musculoskeletal:  Negative for arthralgias.   Skin:  Negative for rash.   Neurological:  Negative for dizziness and headaches.   Hematological:  Does not bruise/bleed easily.   Psychiatric/Behavioral:  Negative for behavioral problems.    All other systems reviewed and are  "negative.      Objective   No results found for: \"HGBA1C\"   /80   Pulse 65   Temp 36.2 °C (97.1 °F)   Wt 49 kg (108 lb)   SpO2 95%   BMI 19.44 kg/m²   Lab Results   Component Value Date    WBC 3.3 (L) 04/26/2024    HGB 13.7 04/26/2024    HCT 40.8 04/26/2024     04/26/2024    CHOL 222 (H) 04/26/2024    TRIG 75 04/26/2024    HDL 84.4 04/26/2024    ALT 17 04/26/2024    AST 21 04/26/2024     04/26/2024    K 4.1 04/26/2024     04/26/2024    CREATININE 0.83 04/26/2024    BUN 13 04/26/2024    CO2 32 04/26/2024    TSH 1.02 04/26/2024     par   Physical Exam  Vitals and nursing note reviewed.   Constitutional:       Appearance: Normal appearance.   HENT:      Head: Normocephalic.      Nose: Nose normal.   Eyes:      Conjunctiva/sclera: Conjunctivae normal.      Pupils: Pupils are equal, round, and reactive to light.   Cardiovascular:      Rate and Rhythm: Regular rhythm.   Pulmonary:      Effort: Pulmonary effort is normal.      Breath sounds: Normal breath sounds.   Abdominal:      General: Abdomen is flat.      Palpations: Abdomen is soft.   Musculoskeletal:      Cervical back: Neck supple.   Skin:     General: Skin is warm.   Neurological:      General: No focal deficit present.      Mental Status: She is oriented to person, place, and time.   Psychiatric:         Mood and Affect: Mood normal.         Assessment/Plan   Olga Lidia was seen today for hypertension and results.  Diagnoses and all orders for this visit:  Localized osteoporosis without current pathological fracture (Primary)  Irritable bowel syndrome, unspecified type  -     dicyclomine (Bentyl) 20 mg tablet; TAKE 1/2 (ONE-HALF) TABLET BY MOUTH TWICE DAILY AS NEEDED FOR BOWEL SPASM.  Hypertension, unspecified type  -     valsartan (Diovan) 160 mg tablet; Take 1 tablet (160 mg) by mouth once daily.  PSVT (paroxysmal supraventricular tachycardia) (CMS-HCC)  Other orders  -     Follow Up In Primary Care - Established  -     denosumab " (Prolia) injection 60 mg  -     sodium chloride 0.9 % bolus 500 mL  -     dextrose 5 % in water (D5W) bolus  -     diphenhydrAMINE (BENADryl) injection 50 mg  -     methylPREDNISolone sod succinate (SOLU-Medrol) 40 mg/mL injection 40 mg  -     famotidine PF (Pepcid) injection 20 mg  -     EPINEPHrine (Epipen) injection syringe 0.3 mg  -     albuterol 2.5 mg /3 mL (0.083 %) nebulizer solution 3 mL    - Recent blood work reviewed  - Hypercholesterolemia maximize medical management continue low-fat diet  -Osteoporosis, patient declined treatment in the past lost to have-  Left hip osteoporosis strong family history of osteoporosis in her mother and multiple fractures  Patient counseled about calcium and vitamin D exercise  Started Prolia injection every 6 months for 2 years follow-up bone densitometry in 2 years repeat in 2026  - Screening for colon cancer obtained November 2020 2 repeat in November 2027 5 years  - Screening mammogram up-to-date   -- Mitral valve prolapse and COPD stable no recurrence no shortness of breath no palpitation no leg swelling no orthopnea  --Hypertension improved continue with valsartan  -  Irritable bowel syndrome continue with dicyclomine as needed high-fiber diet symptoms are controlled  Follow-up 6 months, as a scheduled

## 2024-05-02 ENCOUNTER — APPOINTMENT (OUTPATIENT)
Dept: HEMATOLOGY/ONCOLOGY | Facility: HOSPITAL | Age: 69
End: 2024-05-02
Payer: MEDICARE

## 2024-05-02 ENCOUNTER — TELEPHONE (OUTPATIENT)
Dept: HEMATOLOGY/ONCOLOGY | Facility: HOSPITAL | Age: 69
End: 2024-05-02
Payer: MEDICARE

## 2024-05-02 NOTE — TELEPHONE ENCOUNTER
Received Prolia order, ordered by PCP, Gunnar Johnson MD. Patient was scheduled for today, 5/2/24 at 9:30 AM. Appointment was cancelled by Betsy Sebastian. Reason: Patient: Canceled via MyChart. Appointment was not rescheduled. Reached out to patient to reschedule appointment. Patient declined. Patient states after looking into the side effects of Prolia, she is not sure she wants to get the injection. Informed patient when/if she is ready to schedule, she can give our office a call. Provided patient with call back number. Patient verbalized understanding and agreed to plan of care. Deferred infusion appointment request for 30 days.

## 2024-06-03 ENCOUNTER — TELEPHONE (OUTPATIENT)
Dept: HEMATOLOGY/ONCOLOGY | Facility: HOSPITAL | Age: 69
End: 2024-06-03
Payer: MEDICARE

## 2024-06-03 NOTE — TELEPHONE ENCOUNTER
Reached out to patient regarding her Prolia requests that she had cancelled. Patient states she does not want to get the injections, that she doesn't feel comfortable with them. Let patient know if there is any change please to let us know.

## 2024-10-29 ENCOUNTER — APPOINTMENT (OUTPATIENT)
Dept: HEMATOLOGY/ONCOLOGY | Facility: HOSPITAL | Age: 69
End: 2024-10-29
Payer: MEDICARE

## 2024-10-30 DIAGNOSIS — I10 HYPERTENSION, UNSPECIFIED TYPE: ICD-10-CM

## 2024-10-30 RX ORDER — VALSARTAN 160 MG/1
160 TABLET ORAL DAILY
Qty: 90 TABLET | Refills: 0 | Status: SHIPPED | OUTPATIENT
Start: 2024-10-30

## 2024-11-05 ENCOUNTER — APPOINTMENT (OUTPATIENT)
Dept: PRIMARY CARE | Facility: CLINIC | Age: 69
End: 2024-11-05
Payer: MEDICARE

## 2024-11-05 VITALS
WEIGHT: 109.2 LBS | TEMPERATURE: 95.4 F | HEIGHT: 63 IN | HEART RATE: 54 BPM | DIASTOLIC BLOOD PRESSURE: 80 MMHG | BODY MASS INDEX: 19.35 KG/M2 | OXYGEN SATURATION: 100 % | SYSTOLIC BLOOD PRESSURE: 130 MMHG

## 2024-11-05 DIAGNOSIS — E53.8 VITAMIN B12 DEFICIENCY: ICD-10-CM

## 2024-11-05 DIAGNOSIS — E55.9 VITAMIN D DEFICIENCY, UNSPECIFIED: ICD-10-CM

## 2024-11-05 DIAGNOSIS — I34.1 MITRAL VALVE PROLAPSE: ICD-10-CM

## 2024-11-05 DIAGNOSIS — Z79.899 HIGH RISK MEDICATION USE: ICD-10-CM

## 2024-11-05 DIAGNOSIS — E78.00 HYPERCHOLESTEREMIA: ICD-10-CM

## 2024-11-05 DIAGNOSIS — K58.9 IRRITABLE BOWEL SYNDROME, UNSPECIFIED TYPE: ICD-10-CM

## 2024-11-05 DIAGNOSIS — M81.6 LOCALIZED OSTEOPOROSIS WITHOUT CURRENT PATHOLOGICAL FRACTURE: ICD-10-CM

## 2024-11-05 DIAGNOSIS — I47.10 PSVT (PAROXYSMAL SUPRAVENTRICULAR TACHYCARDIA) (CMS-HCC): ICD-10-CM

## 2024-11-05 DIAGNOSIS — Z12.31 ENCOUNTER FOR SCREENING MAMMOGRAM FOR MALIGNANT NEOPLASM OF BREAST: ICD-10-CM

## 2024-11-05 DIAGNOSIS — I10 HYPERTENSION, UNSPECIFIED TYPE: ICD-10-CM

## 2024-11-05 DIAGNOSIS — Z00.00 ROUTINE GENERAL MEDICAL EXAMINATION AT HEALTH CARE FACILITY: Primary | ICD-10-CM

## 2024-11-05 DIAGNOSIS — R53.83 OTHER FATIGUE: ICD-10-CM

## 2024-11-05 PROCEDURE — 1159F MED LIST DOCD IN RCRD: CPT | Performed by: INTERNAL MEDICINE

## 2024-11-05 PROCEDURE — 99214 OFFICE O/P EST MOD 30 MIN: CPT | Performed by: INTERNAL MEDICINE

## 2024-11-05 PROCEDURE — 3075F SYST BP GE 130 - 139MM HG: CPT | Performed by: INTERNAL MEDICINE

## 2024-11-05 PROCEDURE — 1160F RVW MEDS BY RX/DR IN RCRD: CPT | Performed by: INTERNAL MEDICINE

## 2024-11-05 PROCEDURE — 3079F DIAST BP 80-89 MM HG: CPT | Performed by: INTERNAL MEDICINE

## 2024-11-05 PROCEDURE — 1036F TOBACCO NON-USER: CPT | Performed by: INTERNAL MEDICINE

## 2024-11-05 PROCEDURE — 3008F BODY MASS INDEX DOCD: CPT | Performed by: INTERNAL MEDICINE

## 2024-11-05 PROCEDURE — 1124F ACP DISCUSS-NO DSCNMKR DOCD: CPT | Performed by: INTERNAL MEDICINE

## 2024-11-05 PROCEDURE — G0439 PPPS, SUBSEQ VISIT: HCPCS | Performed by: INTERNAL MEDICINE

## 2024-11-05 PROCEDURE — 1170F FXNL STATUS ASSESSED: CPT | Performed by: INTERNAL MEDICINE

## 2024-11-05 RX ORDER — DICYCLOMINE HYDROCHLORIDE 20 MG/1
TABLET ORAL
Qty: 90 TABLET | Refills: 1 | Status: SHIPPED | OUTPATIENT
Start: 2024-11-05

## 2024-11-05 ASSESSMENT — ACTIVITIES OF DAILY LIVING (ADL)
DRESSING: INDEPENDENT
GROCERY_SHOPPING: INDEPENDENT
BATHING: INDEPENDENT
MANAGING_FINANCES: INDEPENDENT
TAKING_MEDICATION: INDEPENDENT
DOING_HOUSEWORK: INDEPENDENT

## 2024-11-05 ASSESSMENT — PATIENT HEALTH QUESTIONNAIRE - PHQ9
2. FEELING DOWN, DEPRESSED OR HOPELESS: NOT AT ALL
SUM OF ALL RESPONSES TO PHQ9 QUESTIONS 1 AND 2: 1
2. FEELING DOWN, DEPRESSED OR HOPELESS: NOT AT ALL
1. LITTLE INTEREST OR PLEASURE IN DOING THINGS: SEVERAL DAYS
1. LITTLE INTEREST OR PLEASURE IN DOING THINGS: NOT AT ALL
SUM OF ALL RESPONSES TO PHQ9 QUESTIONS 1 AND 2: 0

## 2024-11-05 ASSESSMENT — ENCOUNTER SYMPTOMS
HEADACHES: 0
COUGH: 0
WHEEZING: 0
ABDOMINAL PAIN: 0
SORE THROAT: 0
ARTHRALGIAS: 0
DIFFICULTY URINATING: 0
SINUS PAIN: 0
DIZZINESS: 0
DIARRHEA: 0
BLOOD IN STOOL: 0
FEVER: 0
UNEXPECTED WEIGHT CHANGE: 0
PALPITATIONS: 0
BRUISES/BLEEDS EASILY: 0
FATIGUE: 0

## 2024-11-05 NOTE — PROGRESS NOTES
"Subjective   Patient ID: Olga Lidia Valentin is a 69 y.o. female who presents for Medicare Annual Wellness Visit Subsequent and Osteoporosis.    HPI       Review of Systems    Objective   No results found for: \"HGBA1C\"   /82   Pulse 54   Temp 35.2 °C (95.4 °F)   Ht 1.588 m (5' 2.5\")   Wt 49.5 kg (109 lb 3.2 oz)   SpO2 100%   BMI 19.65 kg/m²     Physical Exam    Assessment/Plan   Olga Lidia was seen today for medicare annual wellness visit subsequent and osteoporosis.  Diagnoses and all orders for this visit:  Irritable bowel syndrome, unspecified type  Other orders  -     Follow Up In Primary Care - Established     "

## 2024-11-05 NOTE — ASSESSMENT & PLAN NOTE
Annual preventive visit  - Vaccinations reviewed needs a postop tetanus vaccine  Flu vaccine up-to-date COVID-vaccine up-to-date  -Needs a screening mammogram  - Osteoporosis patient declined treatment on calcium and vitamin D aware of risk for fractures  - Hypercholesterolemia maximize medical management continue low-fat diet  -- Screening for colon cancer obtained November 2020 2 repeat in November 2027 5 years    Screen for depression negative  - Advance objective reviewed    Follow-up    -- Mitral valve prolapse, compensated no chest pain implications  - COPD stable no recurrence no shortness of breath no palpitation no leg swelling no orthopnea  --Hypertension improved continue with valsartan continue low-salt diet  -  Irritable bowel syndrome continue with dicyclomine as needed high-fiber diet symptoms are controlled continue with high-fiber diet  Follow-up 6 months, as a scheduled complete blood work before next appointment

## 2024-11-05 NOTE — PROGRESS NOTES
Subjective   Reason for Visit: Olga Lidia Valentin is an 69 y.o. female here for a Medicare Wellness visit.     Past Medical, Surgical, and Family History reviewed and updated in chart.    Reviewed all medications by prescribing practitioner or clinical pharmacist (such as prescriptions, OTCs, herbal therapies and supplements) and documented in the medical record.    Annual preventive visit  - Vaccinations reviewed needs a postop tetanus vaccine  Flu vaccine up-to-date COVID-vaccine up-to-date  -Needs a screening mammogram  - Osteoporosis patient declined treatment on calcium and vitamin D aware of risk for fractures  - Hypercholesterolemia maximize medical management continue low-fat diet  -- Screening for colon cancer obtained November 2020 2 repeat in November 2027 5 years    Screen for depression negative  - Advance objective reviewed    Follow-up    -- Mitral valve prolapse, compensated no chest pain implications  - COPD stable no recurrence no shortness of breath no palpitation no leg swelling no orthopnea  --Hypertension improved continue with valsartan continue low-salt diet  -  Irritable bowel syndrome continue with dicyclomine as needed high-fiber diet symptoms are controlled continue with high-fiber diet  Follow-up 6 months, as a scheduled complete blood work before next appointment          Patient Care Team:  Gunnar Johnson MD as PCP - General  [facility] Johnson [facility]     Review of Systems   Constitutional:  Negative for fatigue, fever and unexpected weight change.   HENT:  Negative for congestion, ear discharge, ear pain, mouth sores, sinus pain and sore throat.    Eyes:  Negative for visual disturbance.   Respiratory:  Negative for cough and wheezing.    Cardiovascular:  Negative for chest pain, palpitations and leg swelling.   Gastrointestinal:  Negative for abdominal pain, blood in stool and diarrhea.   Genitourinary:  Negative for difficulty urinating.  "  Musculoskeletal:  Negative for arthralgias.   Skin:  Negative for rash.   Neurological:  Negative for dizziness and headaches.   Hematological:  Does not bruise/bleed easily.   Psychiatric/Behavioral:  Negative for behavioral problems.    All other systems reviewed and are negative.      Objective   Vitals:  /80   Pulse 54   Temp 35.2 °C (95.4 °F)   Ht 1.588 m (5' 2.5\")   Wt 49.5 kg (109 lb 3.2 oz)   SpO2 100%   BMI 19.65 kg/m²     Lab Results   Component Value Date    WBC 3.3 (L) 04/26/2024    HGB 13.7 04/26/2024    HCT 40.8 04/26/2024     04/26/2024    CHOL 222 (H) 04/26/2024    TRIG 75 04/26/2024    HDL 84.4 04/26/2024    ALT 17 04/26/2024    AST 21 04/26/2024     04/26/2024    K 4.1 04/26/2024     04/26/2024    CREATININE 0.83 04/26/2024    BUN 13 04/26/2024    CO2 32 04/26/2024    TSH 1.02 04/26/2024     par   Physical Exam  Vitals and nursing note reviewed.   Constitutional:       Appearance: Normal appearance.   HENT:      Head: Normocephalic.      Nose: Nose normal.   Eyes:      Conjunctiva/sclera: Conjunctivae normal.      Pupils: Pupils are equal, round, and reactive to light.   Cardiovascular:      Rate and Rhythm: Regular rhythm.   Pulmonary:      Effort: Pulmonary effort is normal.      Breath sounds: Normal breath sounds.   Abdominal:      General: Abdomen is flat.      Palpations: Abdomen is soft.   Musculoskeletal:      Cervical back: Neck supple.   Skin:     General: Skin is warm.   Neurological:      General: No focal deficit present.      Mental Status: She is oriented to person, place, and time.   Psychiatric:         Mood and Affect: Mood normal.         Assessment & Plan  Irritable bowel syndrome, unspecified type    Orders:    dicyclomine (Bentyl) 20 mg tablet; TAKE 1/2 (ONE-HALF) TABLET BY MOUTH TWICE DAILY AS NEEDED FOR BOWEL SPASM.    Routine general medical examination at health care facility    Orders:    1 Year Follow Up In Primary Care - Wellness Exam; " Future    diphth,pertus,acell,,tetanus (BoostRIX) 2.5-8-5 Lf-mcg-Lf/0.5mL injection; Inject 0.5 mL into the muscle 1 time for 1 dose.    High risk medication use    Orders:    CBC and Auto Differential; Future    Encounter for screening mammogram for malignant neoplasm of breast    Orders:    BI mammo bilateral screening tomosynthesis; Future    Hypertension, unspecified type    Orders:    Comprehensive Metabolic Panel; Future    Hypercholesteremia    Orders:    Lipid Panel; Future    Vitamin B12 deficiency    Orders:    Vitamin B12; Future    Other fatigue    Orders:    TSH with reflex to Free T4 if abnormal; Future    Vitamin D deficiency, unspecified    Orders:    Vitamin D 25-Hydroxy,Total (for eval of Vitamin D levels); Future    Localized osteoporosis without current pathological fracture         Mitral valve prolapse         PSVT (paroxysmal supraventricular tachycardia) (CMS-HCC)       Annual preventive visit  - Vaccinations reviewed needs a postop tetanus vaccine  Flu vaccine up-to-date COVID-vaccine up-to-date  -Needs a screening mammogram  - Osteoporosis patient declined treatment on calcium and vitamin D aware of risk for fractures  - Hypercholesterolemia maximize medical management continue low-fat diet  -- Screening for colon cancer obtained November 2020 2 repeat in November 2027 5 years    Screen for depression negative  - Advance objective reviewed    Follow-up    -- Mitral valve prolapse, compensated no chest pain implications  - COPD stable no recurrence no shortness of breath no palpitation no leg swelling no orthopnea  --Hypertension improved continue with valsartan continue low-salt diet  -  Irritable bowel syndrome continue with dicyclomine as needed high-fiber diet symptoms are controlled continue with high-fiber diet  Follow-up 6 months, as a scheduled complete blood work before next appointment

## 2024-11-05 NOTE — ASSESSMENT & PLAN NOTE
Orders:    dicyclomine (Bentyl) 20 mg tablet; TAKE 1/2 (ONE-HALF) TABLET BY MOUTH TWICE DAILY AS NEEDED FOR BOWEL SPASM.

## 2024-11-22 ENCOUNTER — HOSPITAL ENCOUNTER (OUTPATIENT)
Dept: RADIOLOGY | Facility: HOSPITAL | Age: 69
Discharge: HOME | End: 2024-11-22
Payer: MEDICARE

## 2024-11-22 VITALS — BODY MASS INDEX: 19.69 KG/M2 | HEIGHT: 62 IN | WEIGHT: 107 LBS

## 2024-11-22 DIAGNOSIS — Z12.31 ENCOUNTER FOR SCREENING MAMMOGRAM FOR MALIGNANT NEOPLASM OF BREAST: ICD-10-CM

## 2024-11-22 PROCEDURE — 77063 BREAST TOMOSYNTHESIS BI: CPT | Performed by: RADIOLOGY

## 2024-11-22 PROCEDURE — 77067 SCR MAMMO BI INCL CAD: CPT

## 2024-11-22 PROCEDURE — 77067 SCR MAMMO BI INCL CAD: CPT | Performed by: RADIOLOGY

## 2024-11-23 ENCOUNTER — APPOINTMENT (OUTPATIENT)
Dept: CARDIOLOGY | Facility: HOSPITAL | Age: 69
End: 2024-11-23
Payer: MEDICARE

## 2024-11-23 ENCOUNTER — APPOINTMENT (OUTPATIENT)
Dept: RADIOLOGY | Facility: HOSPITAL | Age: 69
End: 2024-11-23
Payer: MEDICARE

## 2024-11-23 ENCOUNTER — HOSPITAL ENCOUNTER (EMERGENCY)
Facility: HOSPITAL | Age: 69
Discharge: HOME | End: 2024-11-23
Attending: EMERGENCY MEDICINE
Payer: MEDICARE

## 2024-11-23 VITALS
TEMPERATURE: 98 F | DIASTOLIC BLOOD PRESSURE: 77 MMHG | HEART RATE: 61 BPM | HEIGHT: 62 IN | SYSTOLIC BLOOD PRESSURE: 146 MMHG | WEIGHT: 107 LBS | BODY MASS INDEX: 19.69 KG/M2 | OXYGEN SATURATION: 97 % | RESPIRATION RATE: 19 BRPM

## 2024-11-23 DIAGNOSIS — I47.10 SVT (SUPRAVENTRICULAR TACHYCARDIA) (CMS-HCC): Primary | ICD-10-CM

## 2024-11-23 LAB
ALBUMIN SERPL BCP-MCNC: 5.5 G/DL (ref 3.4–5)
ALP SERPL-CCNC: 110 U/L (ref 33–136)
ALT SERPL W P-5'-P-CCNC: 27 U/L (ref 7–45)
ANION GAP SERPL CALC-SCNC: 13 MMOL/L (ref 10–20)
AST SERPL W P-5'-P-CCNC: 29 U/L (ref 9–39)
BASOPHILS # BLD AUTO: 0.06 X10*3/UL (ref 0–0.1)
BASOPHILS NFR BLD AUTO: 1.3 %
BILIRUB SERPL-MCNC: 0.8 MG/DL (ref 0–1.2)
BNP SERPL-MCNC: 34 PG/ML (ref 0–99)
BUN SERPL-MCNC: 15 MG/DL (ref 6–23)
CALCIUM SERPL-MCNC: 10.5 MG/DL (ref 8.6–10.3)
CARDIAC TROPONIN I PNL SERPL HS: 3 NG/L (ref 0–13)
CARDIAC TROPONIN I PNL SERPL HS: 3 NG/L (ref 0–13)
CHLORIDE SERPL-SCNC: 100 MMOL/L (ref 98–107)
CO2 SERPL-SCNC: 29 MMOL/L (ref 21–32)
CREAT SERPL-MCNC: 0.93 MG/DL (ref 0.5–1.05)
D DIMER PPP FEU-MCNC: <215 NG/ML FEU
EGFRCR SERPLBLD CKD-EPI 2021: 68 ML/MIN/1.73M*2
EOSINOPHIL # BLD AUTO: 0.02 X10*3/UL (ref 0–0.7)
EOSINOPHIL NFR BLD AUTO: 0.4 %
ERYTHROCYTE [DISTWIDTH] IN BLOOD BY AUTOMATED COUNT: 12.5 % (ref 11.5–14.5)
GLUCOSE SERPL-MCNC: 106 MG/DL (ref 74–99)
HCT VFR BLD AUTO: 48.7 % (ref 36–46)
HGB BLD-MCNC: 16 G/DL (ref 12–16)
IMM GRANULOCYTES # BLD AUTO: 0.01 X10*3/UL (ref 0–0.7)
IMM GRANULOCYTES NFR BLD AUTO: 0.2 % (ref 0–0.9)
LYMPHOCYTES # BLD AUTO: 1.84 X10*3/UL (ref 1.2–4.8)
LYMPHOCYTES NFR BLD AUTO: 41 %
MAGNESIUM SERPL-MCNC: 2.11 MG/DL (ref 1.6–2.4)
MCH RBC QN AUTO: 30.7 PG (ref 26–34)
MCHC RBC AUTO-ENTMCNC: 32.9 G/DL (ref 32–36)
MCV RBC AUTO: 93 FL (ref 80–100)
MONOCYTES # BLD AUTO: 0.27 X10*3/UL (ref 0.1–1)
MONOCYTES NFR BLD AUTO: 6 %
NEUTROPHILS # BLD AUTO: 2.29 X10*3/UL (ref 1.2–7.7)
NEUTROPHILS NFR BLD AUTO: 51.1 %
NRBC BLD-RTO: 0 /100 WBCS (ref 0–0)
PLATELET # BLD AUTO: 248 X10*3/UL (ref 150–450)
POTASSIUM SERPL-SCNC: 4 MMOL/L (ref 3.5–5.3)
PROT SERPL-MCNC: 8.9 G/DL (ref 6.4–8.2)
RBC # BLD AUTO: 5.22 X10*6/UL (ref 4–5.2)
SODIUM SERPL-SCNC: 138 MMOL/L (ref 136–145)
TSH SERPL-ACNC: 0.95 MIU/L (ref 0.44–3.98)
WBC # BLD AUTO: 4.5 X10*3/UL (ref 4.4–11.3)

## 2024-11-23 PROCEDURE — 99283 EMERGENCY DEPT VISIT LOW MDM: CPT | Mod: 25

## 2024-11-23 PROCEDURE — 80053 COMPREHEN METABOLIC PANEL: CPT | Performed by: EMERGENCY MEDICINE

## 2024-11-23 PROCEDURE — 85025 COMPLETE CBC W/AUTO DIFF WBC: CPT | Performed by: EMERGENCY MEDICINE

## 2024-11-23 PROCEDURE — 71045 X-RAY EXAM CHEST 1 VIEW: CPT

## 2024-11-23 PROCEDURE — 36415 COLL VENOUS BLD VENIPUNCTURE: CPT | Performed by: EMERGENCY MEDICINE

## 2024-11-23 PROCEDURE — 84484 ASSAY OF TROPONIN QUANT: CPT | Performed by: EMERGENCY MEDICINE

## 2024-11-23 PROCEDURE — 85379 FIBRIN DEGRADATION QUANT: CPT | Performed by: EMERGENCY MEDICINE

## 2024-11-23 PROCEDURE — 93005 ELECTROCARDIOGRAM TRACING: CPT

## 2024-11-23 PROCEDURE — 71045 X-RAY EXAM CHEST 1 VIEW: CPT | Performed by: RADIOLOGY

## 2024-11-23 PROCEDURE — 83735 ASSAY OF MAGNESIUM: CPT | Performed by: EMERGENCY MEDICINE

## 2024-11-23 PROCEDURE — 84443 ASSAY THYROID STIM HORMONE: CPT | Performed by: EMERGENCY MEDICINE

## 2024-11-23 PROCEDURE — 83880 ASSAY OF NATRIURETIC PEPTIDE: CPT | Performed by: EMERGENCY MEDICINE

## 2024-11-23 ASSESSMENT — COLUMBIA-SUICIDE SEVERITY RATING SCALE - C-SSRS
6. HAVE YOU EVER DONE ANYTHING, STARTED TO DO ANYTHING, OR PREPARED TO DO ANYTHING TO END YOUR LIFE?: NO
2. HAVE YOU ACTUALLY HAD ANY THOUGHTS OF KILLING YOURSELF?: NO
1. IN THE PAST MONTH, HAVE YOU WISHED YOU WERE DEAD OR WISHED YOU COULD GO TO SLEEP AND NOT WAKE UP?: NO

## 2024-11-23 ASSESSMENT — PAIN SCALES - GENERAL: PAINLEVEL_OUTOF10: 0 - NO PAIN

## 2024-11-23 ASSESSMENT — PAIN - FUNCTIONAL ASSESSMENT: PAIN_FUNCTIONAL_ASSESSMENT: 0-10

## 2024-11-23 NOTE — DISCHARGE INSTRUCTIONS
Continue home medications.    Dr. Gunter's office should call you Monday for outpatient follow-up.    Return for any worsening symptoms or concerns.

## 2024-11-23 NOTE — ED TRIAGE NOTES
Pt arrives ambulatory to [facility], presenting with an irregular heart beat that has been intermittent for a few days. Pt also concerned with her BP being elevated, takes Vasartan nightly. Pt sees Dr. Dawn but was unable to get into the office today. Pt states she feels her heart is beating irregular and working harder. Pt denies any CP, SOB, N/V/D, fever and/or chills. Pt A&Ox4, resp easy and unlabored, skin of appropriate color.

## 2024-11-23 NOTE — ED PROVIDER NOTES
[facility]  ED  Provider Note  11/23/2024 10:01 AM  ENT11/ENT11              Chief Complaint   Patient presents with    Irregular Heart Beat         History of Present Illness:   Olga Lidia Valentin is a 69 y.o. female presenting to the ED for episode and irregular heartbeat, beginning 2 days ago.  The complaint has been intermittent, mild to moderate in severity, and worsened by nothing.  Patient has a remote history of SVT in the past.  She denies any recent change in medication or diet.  She describes a harder racing heartbeat that is irregular that comes and goes lasting a few minutes at a time.  She denies any shortness of breath fever or chills.  She has no significant chest pain.      Review of Systems:   Pertinent positives and review of systems as noted above.  Remaining 10 review of systems is negative or noncontributory to today's episode of care.  Review of Systems       --------------------------------------------- PAST HISTORY ---------------------------------------------  Past Medical History:   Diagnosis Date    Abnormal results of kidney function studies 06/21/2013    Renal function test abnormal    Asymptomatic menopausal state 03/09/2015    History of menopause    Asymptomatic menopausal state 03/12/2018    Postmenopausal status    Encounter for other preprocedural examination 03/09/2015    Preop examination    Encounter for screening for other viral diseases 10/16/2018    Need for hepatitis C screening test    Insomnia, unspecified 09/24/2015    Persistent insomnia    Other conditions influencing health status     No significant past medical history    Other long term (current) drug therapy 10/16/2018    Long term use of drug    Other specified noninflammatory disorders of vulva and perineum 08/17/2016    Vulvar lesion    Other tear of medial meniscus, current injury, right knee, subsequent encounter 10/10/2018    Tear of medial meniscus of right knee, current, unspecified tear type,  subsequent encounter    Pain in right knee 11/02/2018    Right knee pain, unspecified chronicity    Peripheral tear of medial meniscus, current injury, right knee, initial encounter 10/26/2018    Peripheral tear of medial meniscus of right knee, unspecified whether old or current tear, initial encounter    Personal history of other diseases of the female genital tract 08/17/2016    History of postmenopausal bleeding    Personal history of other drug therapy 09/26/2017    History of influenza vaccination    Personal history of other endocrine, nutritional and metabolic disease 10/02/2014    History of hypothyroidism    Personal history of other medical treatment 03/12/2018    History of screening mammography    Personal history of other specified conditions 06/25/2013    History of urinary frequency    Personal history of other specified conditions 09/17/2013    History of abnormal weight loss    Personal history of urinary (tract) infections 06/25/2013    History of urinary tract infection    Postmenopausal atrophic vaginitis 09/26/2017    Atrophic vaginitis         has a past surgical history that includes Hysterectomy (03/09/2015); Appendectomy (03/09/2015); and Other surgical history (08/30/2022).     reports that she has quit smoking. Her smoking use included cigarettes. She has a 5 pack-year smoking history. She has never used smokeless tobacco. She reports current alcohol use of about 5.0 standard drinks of alcohol per week. She reports that she does not use drugs.    family history includes Breast cancer in her sister; Endometrial cancer in her mother. Unless otherwise noted, family history is non contributory    Patient's Medications   New Prescriptions    No medications on file   Previous Medications    CALCIUM CARBONATE (CALTRATE 600 ORAL)    Take by mouth.    DICYCLOMINE (BENTYL) 20 MG TABLET    TAKE 1/2 (ONE-HALF) TABLET BY MOUTH TWICE DAILY AS NEEDED FOR BOWEL SPASM.    MULTIVITAMIN CAPSULE    Take 1  capsule by mouth once daily.    VALSARTAN (DIOVAN) 160 MG TABLET    Take 1 tablet (160 mg) by mouth once daily.   Modified Medications    No medications on file   Discontinued Medications    No medications on file      The patient’s home medications have been reviewed.    Allergies: Patient has no known allergies.    -------------------------------------------------- RESULTS -------------------------------------------------  All laboratory and radiology results have been personally reviewed by myself   LABS:  Labs Reviewed   CBC WITH AUTO DIFFERENTIAL - Abnormal       Result Value    WBC 4.5      nRBC 0.0      RBC 5.22 (*)     Hemoglobin 16.0      Hematocrit 48.7 (*)     MCV 93      MCH 30.7      MCHC 32.9      RDW 12.5      Platelets 248      Neutrophils % 51.1      Immature Granulocytes %, Automated 0.2      Lymphocytes % 41.0      Monocytes % 6.0      Eosinophils % 0.4      Basophils % 1.3      Neutrophils Absolute 2.29      Immature Granulocytes Absolute, Automated 0.01      Lymphocytes Absolute 1.84      Monocytes Absolute 0.27      Eosinophils Absolute 0.02      Basophils Absolute 0.06     COMPREHENSIVE METABOLIC PANEL - Abnormal    Glucose 106 (*)     Sodium 138      Potassium 4.0      Chloride 100      Bicarbonate 29      Anion Gap 13      Urea Nitrogen 15      Creatinine 0.93      eGFR 68      Calcium 10.5 (*)     Albumin 5.5 (*)     Alkaline Phosphatase 110      Total Protein 8.9 (*)     AST 29      Bilirubin, Total 0.8      ALT 27     B-TYPE NATRIURETIC PEPTIDE - Normal    BNP 34      Narrative:        <100 pg/mL - Heart failure unlikely  100-299 pg/mL - Intermediate probability of acute heart                  failure exacerbation. Correlate with clinical                  context and patient history.    >=300 pg/mL - Heart Failure likely. Correlate with clinical                  context and patient history.    BNP testing is performed using different testing methodology at [facility] than at  [facility]. Direct result comparisons should only be made within the same method.      D-DIMER, NON VTE - Normal    D-Dimer Non VTE, Quant (ng/mL FEU) <215      Narrative:     The D-Dimer assay is reported in ng/mL Fibrinogen Equivalent Units (FEU). The results of this assay should NOT be used for the exclusion of Deep Vein Thrombosis and/or Pulmonary Embolism.   SERIAL TROPONIN-INITIAL - Normal    Troponin I, High Sensitivity 3      Narrative:     Less than 99th percentile of normal range cutoff-  Female and children under 18 years old <14 ng/L; Male <21 ng/L: Negative  Repeat testing should be performed if clinically indicated.     Female and children under 18 years old 14-50 ng/L; Male 21-50 ng/L:  Consistent with possible cardiac damage and possible increased clinical   risk. Serial measurements may help to assess extent of myocardial damage.     >50 ng/L: Consistent with cardiac damage, increased clinical risk and  myocardial infarction. Serial measurements may help assess extent of   myocardial damage.      NOTE: Children less than 1 year old may have higher baseline troponin   levels and results should be interpreted in conjunction with the overall   clinical context.     NOTE: Troponin I testing is performed using a different   testing methodology at [facility] than at [facility]. Direct result comparisons should only   be made within the same method.   SERIAL TROPONIN, 1 HOUR - Normal    Troponin I, High Sensitivity 3      Narrative:     Less than 99th percentile of normal range cutoff-  Female and children under 18 years old <14 ng/L; Male <21 ng/L: Negative  Repeat testing should be performed if clinically indicated.     Female and children under 18 years old 14-50 ng/L; Male 21-50 ng/L:  Consistent with possible cardiac damage and possible increased clinical   risk. Serial measurements may help to assess extent of myocardial damage.     >50 ng/L: Consistent with  cardiac damage, increased clinical risk and  myocardial infarction. Serial measurements may help assess extent of   myocardial damage.      NOTE: Children less than 1 year old may have higher baseline troponin   levels and results should be interpreted in conjunction with the overall   clinical context.     NOTE: Troponin I testing is performed using a different   testing methodology at [facility] than at other   [facility]. Direct result comparisons should only   be made within the same method.   TSH WITH REFLEX TO FREE T4 IF ABNORMAL - Normal    Thyroid Stimulating Hormone 0.95      Narrative:     TSH testing is performed using different testing methodology at [facility] than at other [facility]. Direct result comparisons should only be made within the same method.     MAGNESIUM - Normal    Magnesium 2.11     TROPONIN SERIES- (INITIAL, 1 HR)    Narrative:     The following orders were created for panel order Troponin I Series, High Sensitivity (0, 1 HR).  Procedure                               Abnormality         Status                     ---------                               -----------         ------                     Troponin I, High Sensiti...[[id number]]  Normal              Final result               Troponin, High Sensitivi...[[id number]]  Normal              Final result                 Please view results for these tests on the individual orders.       EKG: Sinus arrhythmia at 63 bpm, normal axis, no acute ST elevations.  Interpreted by LILY Pierre MD    EKG: Sinus bradycardia 57 bpm, normal axis, normal intervals, no acute ST changes.  Interpreted by LILY Pierre MD    RADIOLOGY:  Interpreted by Radiologist.  XR chest 1 view   Final Result   No acute cardiopulmonary process is evident.        MACRO:   None        Signed by: Diogo Urbano 11/23/2024 10:28 AM   Dictation workstation:   [id number]          ------------------------- NURSING NOTES AND VITALS  "REVIEWED ---------------------------   The nursing notes within the ED encounter and vital signs as below have been reviewed.   /77   Pulse 67   Temp 36.7 °C (98 °F) (Temporal)   Resp 18   Ht 1.575 m (5' 2\")   Wt 48.5 kg (107 lb)   SpO2 100%   BMI 19.57 kg/m²   Oxygen Saturation Interpretation: Normal      ---------------------------------------------------PHYSICAL EXAM--------------------------------------  Physical Exam   Constitutional/General: Alert and oriented x3, well appearing, non toxic in NAD  Head: Normocephalic and atraumatic  Eyes: PERRL, EOMI, conjunctiva normal, sclera non icteric  Mouth: Oropharynx clear, handling secretions, no trismus, no asymmetry of the posterior oropharynx or uvular edema  Neck: Supple, full ROM, non tender to palpation in the midline, no stridor, no crepitus, no meningeal signs  Respiratory: Lungs clear to auscultation bilaterally, no wheezes, rales, or rhonchi  Cardiovascular:  Regular rate. Regular rhythm. No murmurs, gallops, or rubs. 2+ distal pulses  Chest: No chest wall tenderness  GI:  Abdomen Soft, Non tender, Non distended.  +BS. No organomegaly, no palpable masses,  No rebound, guarding, or rigidity. No CVAT   Musculoskeletal: Moves all extremities x 4. Warm and well perfused, no clubbing, cyanosis, or edema. Capillary refill <3 seconds  Integument: skin warm and dry. No rashes.   Lymphatic: no lymphadenopathy noted  Neurologic:  No focal deficits, symmetric strength 5/5 in the upper and lower extremities bilaterally  Psychiatric: Normal Affect    Procedures    Diagnoses as of 11/23/24 1318   SVT (supraventricular tachycardia) (CMS-[facility])          Medical Decision Making:   She has a history of palpitations and SVT in the past.  She did not have a rapid rate here in the ED but did have a significant sinus arrhythmia.  She is not a relatively regular rate at approximately 60 bpm again normal sinus.  Cardiac enzymes are normal x 2.  Electrolytes and magnesium " levels are normal.  TSH is normal.  The patient is stable for outpatient management.  I will consult Dr. Gunter's office and ask them to call her for an appointment as soon as possible for further evaluation and treatment.      Counseling:   The emergency provider has spoken with the patient and spouse/SO and discussed today’s results, in addition to providing specific details for the plan of care and counseling regarding the diagnosis and prognosis.  Questions are answered at this time and they are agreeable with the plan.      --------------------------------- IMPRESSION AND DISPOSITION ---------------------------------        IMPRESSION  1. SVT (supraventricular tachycardia) (CMS-[facility])        DISPOSITION  Disposition: Discharge to home  Patient condition is fair      Billing Provider Critical Care Time: 0 minutes     Hong Pierre MD  11/23/24 [id number]

## 2024-11-26 ENCOUNTER — OFFICE VISIT (OUTPATIENT)
Dept: CARDIOLOGY | Facility: HOSPITAL | Age: 69
End: 2024-11-26
Payer: MEDICARE

## 2024-11-26 VITALS
OXYGEN SATURATION: 99 % | WEIGHT: 105.6 LBS | SYSTOLIC BLOOD PRESSURE: 192 MMHG | DIASTOLIC BLOOD PRESSURE: 82 MMHG | HEART RATE: 75 BPM | BODY MASS INDEX: 19.31 KG/M2

## 2024-11-26 DIAGNOSIS — R06.09 DOE (DYSPNEA ON EXERTION): ICD-10-CM

## 2024-11-26 DIAGNOSIS — I47.10 SVT (SUPRAVENTRICULAR TACHYCARDIA) (CMS-HCC): ICD-10-CM

## 2024-11-26 DIAGNOSIS — R94.31 ABNORMAL EKG: ICD-10-CM

## 2024-11-26 DIAGNOSIS — R00.2 PALPITATIONS: ICD-10-CM

## 2024-11-26 DIAGNOSIS — I34.1 MITRAL VALVE PROLAPSE: ICD-10-CM

## 2024-11-26 DIAGNOSIS — R42 DIZZINESS: ICD-10-CM

## 2024-11-26 DIAGNOSIS — I10 PRIMARY HYPERTENSION: Primary | ICD-10-CM

## 2024-11-26 LAB
ATRIAL RATE: 57 BPM
ATRIAL RATE: 63 BPM
P AXIS: 61 DEGREES
P AXIS: 80 DEGREES
P OFFSET: 179 MS
P OFFSET: 194 MS
P ONSET: 128 MS
P ONSET: 144 MS
PR INTERVAL: 158 MS
PR INTERVAL: 192 MS
Q ONSET: 223 MS
Q ONSET: 224 MS
QRS COUNT: 10 BEATS
QRS COUNT: 9 BEATS
QRS DURATION: 78 MS
QRS DURATION: 78 MS
QT INTERVAL: 396 MS
QT INTERVAL: 424 MS
QTC CALCULATION(BAZETT): 405 MS
QTC CALCULATION(BAZETT): 412 MS
QTC FREDERICIA: 402 MS
QTC FREDERICIA: 416 MS
R AXIS: 55 DEGREES
R AXIS: 55 DEGREES
T AXIS: 63 DEGREES
T AXIS: 66 DEGREES
T OFFSET: 421 MS
T OFFSET: 436 MS
VENTRICULAR RATE: 57 BPM
VENTRICULAR RATE: 63 BPM

## 2024-11-26 PROCEDURE — G2211 COMPLEX E/M VISIT ADD ON: HCPCS | Performed by: STUDENT IN AN ORGANIZED HEALTH CARE EDUCATION/TRAINING PROGRAM

## 2024-11-26 PROCEDURE — 99204 OFFICE O/P NEW MOD 45 MIN: CPT | Performed by: STUDENT IN AN ORGANIZED HEALTH CARE EDUCATION/TRAINING PROGRAM

## 2024-11-26 PROCEDURE — 3079F DIAST BP 80-89 MM HG: CPT | Performed by: STUDENT IN AN ORGANIZED HEALTH CARE EDUCATION/TRAINING PROGRAM

## 2024-11-26 PROCEDURE — 99214 OFFICE O/P EST MOD 30 MIN: CPT | Performed by: STUDENT IN AN ORGANIZED HEALTH CARE EDUCATION/TRAINING PROGRAM

## 2024-11-26 PROCEDURE — 3077F SYST BP >= 140 MM HG: CPT | Performed by: STUDENT IN AN ORGANIZED HEALTH CARE EDUCATION/TRAINING PROGRAM

## 2024-11-26 PROCEDURE — 1159F MED LIST DOCD IN RCRD: CPT | Performed by: STUDENT IN AN ORGANIZED HEALTH CARE EDUCATION/TRAINING PROGRAM

## 2024-11-26 RX ORDER — CARVEDILOL 6.25 MG/1
6.25 TABLET ORAL
Qty: 60 TABLET | Refills: 11 | Status: SHIPPED | OUTPATIENT
Start: 2024-11-26 | End: 2025-11-26

## 2024-11-26 NOTE — PROGRESS NOTES
Primary Care Physician: Gunnar Johnson MD   Date of Visit: 11/26/2024 10:00 AM EST  Type of Visit: new      Chief Complaint:  No chief complaint on file.       HPI  Olga Lidia Valentin 69 y.o. female with hx of HTN referred for palpitations     She had a prior hx of reported SVT     She went to ED a few days ago with palpitations, EKG with NSR and PVCs   She does not check her pulse   Palpitations sometimes associated with dizziness   Palpitations have been more often lately, no syncope with it     No bleeding issues  No chest pain or angina   No recent illness or infection   No le edema, PND or orthopnea   She is very active  She works out daily, she started to get some sob with exertion lately but she tries to push her self during exercise.     BP varies at home 140-170s range      In 2015, she fainted and was told she has mitral valve prolapse and SVT, but was not prescribed anything according to her     Review of Systems   Review of Systems   12 points review of systems are negative expect for the above    Social History:  Social History     Socioeconomic History    Marital status:      Spouse name: Not on file    Number of children: Not on file    Years of education: Not on file    Highest education level: Not on file   Occupational History    Not on file   Tobacco Use    Smoking status: Former     Current packs/day: 1.00     Average packs/day: 1 pack/day for 5.0 years (5.0 ttl pk-yrs)     Types: Cigarettes    Smokeless tobacco: Never   Vaping Use    Vaping status: Never Used   Substance and Sexual Activity    Alcohol use: Yes     Alcohol/week: 5.0 standard drinks of alcohol     Types: 5 Glasses of wine per week    Drug use: Never    Sexual activity: Defer   Other Topics Concern    Not on file   Social History Narrative    Not on file     Social Drivers of Health     Financial Resource Strain: Not on file   Food Insecurity: Not on file   Transportation Needs: Not on file   Physical Activity: Not on  file   Stress: Not on file   Social Connections: Not on file   Intimate Partner Violence: Not on file   Housing Stability: Not on file        Past Medical History:  Past Medical History:   Diagnosis Date    Abnormal results of kidney function studies 06/21/2013    Renal function test abnormal    Asymptomatic menopausal state 03/09/2015    History of menopause    Asymptomatic menopausal state 03/12/2018    Postmenopausal status    Encounter for other preprocedural examination 03/09/2015    Preop examination    Encounter for screening for other viral diseases 10/16/2018    Need for hepatitis C screening test    Insomnia, unspecified 09/24/2015    Persistent insomnia    Other conditions influencing health status     No significant past medical history    Other long term (current) drug therapy 10/16/2018    Long term use of drug    Other specified noninflammatory disorders of vulva and perineum 08/17/2016    Vulvar lesion    Other tear of medial meniscus, current injury, right knee, subsequent encounter 10/10/2018    Tear of medial meniscus of right knee, current, unspecified tear type, subsequent encounter    Pain in right knee 11/02/2018    Right knee pain, unspecified chronicity    Peripheral tear of medial meniscus, current injury, right knee, initial encounter 10/26/2018    Peripheral tear of medial meniscus of right knee, unspecified whether old or current tear, initial encounter    Personal history of other diseases of the female genital tract 08/17/2016    History of postmenopausal bleeding    Personal history of other drug therapy 09/26/2017    History of influenza vaccination    Personal history of other endocrine, nutritional and metabolic disease 10/02/2014    History of hypothyroidism    Personal history of other medical treatment 03/12/2018    History of screening mammography    Personal history of other specified conditions 06/25/2013    History of urinary frequency    Personal history of other specified  conditions 09/17/2013    History of abnormal weight loss    Personal history of urinary (tract) infections 06/25/2013    History of urinary tract infection    Postmenopausal atrophic vaginitis 09/26/2017    Atrophic vaginitis       Past Surgical History:  Past Surgical History:   Procedure Laterality Date    APPENDECTOMY  03/09/2015    Appendectomy    HYSTERECTOMY  03/09/2015    Hysterectomy    OTHER SURGICAL HISTORY  08/30/2022    Knee surgery       Family History:  Family History   Problem Relation Name Age of Onset    Endometrial cancer Mother      Breast cancer Sister          Objective:       11/23/2024    12:15 PM 11/23/2024    12:30 PM 11/23/2024    12:45 PM 11/23/2024     1:00 PM 11/23/2024     1:15 PM 11/23/2024     1:30 PM 11/26/2024     9:52 AM   Vitals   Systolic    146   192   Diastolic    77   82   Heart Rate 64 61 58 60 71 61 75   Resp 17 15 16 17 13 19    Weight (lb)       105.6   BMI       19.31 kg/m2   BSA (m2)       1.45 m2   Visit Report       Report      Constitutional:       Appearance: Healthy appearance. Not in distress.   Neck:      Vascular: No JVR. JVD normal.   Pulmonary:      Effort: Pulmonary effort is normal.      Breath sounds: Normal breath sounds. No wheezing. No rhonchi. No rales.   Chest:      Chest wall: Not tender to palpatation.   Cardiovascular:      Normal rate. Regular rhythm. Normal S1. Normal S2.       Murmurs: There is 3/6 ESM max at apex      No gallop.  N No rub.   Pulses:     Intact distal pulses.   Edema:     Peripheral edema absent.   Abdominal:      General: Bowel sounds are normal.      Palpations: Abdomen is soft.      Tenderness: There is no abdominal tenderness.   Musculoskeletal: Normal range of motion.         General: No tenderness.   Skin:     General: Skin is warm and dry.   Neurological:      General: No focal deficit present.      Mental Status: Alert and oriented to person, place and time.     Allergies:  No Known Allergies    Medications:  Current  Outpatient Medications   Medication Instructions    calcium carbonate (CALTRATE 600 ORAL) Take by mouth.    dicyclomine (Bentyl) 20 mg tablet TAKE 1/2 (ONE-HALF) TABLET BY MOUTH TWICE DAILY AS NEEDED FOR BOWEL SPASM.    multivitamin capsule 1 capsule, Daily    valsartan (DIOVAN) 160 mg, oral, Daily        Labs and Imaging:     Lab Results   Component Value Date    WBC 4.5 11/23/2024    HGB 16.0 11/23/2024    HCT 48.7 (H) 11/23/2024     11/23/2024    CHOL 222 (H) 04/26/2024    TRIG 75 04/26/2024    HDL 84.4 04/26/2024    ALT 27 11/23/2024    AST 29 11/23/2024     11/23/2024    K 4.0 11/23/2024     11/23/2024    CREATININE 0.93 11/23/2024    BUN 15 11/23/2024    CO2 29 11/23/2024    TSH 0.95 11/23/2024         Echocardiogram: No results found for this or any previous visit from the past 1825 days.    Stress Testing: No results found for this or any previous visit from the past 1825 days.    Cardiac Catheterization: No results found for this or any previous visit from the past 1825 days.    Cardiac Scoring: No results found for this or any previous visit from the past 1825 days.    AAA : No results found for this or any previous visit from the past 1825 days.    OTHER: No results found for this or any previous visit from the past 1825 days.      The 10-year ASCVD risk score (Nitesh DK, et al., 2019) is: 18%    Values used to calculate the score:      Age: 69 years      Sex: Female      Is Non- : No      Diabetic: No      Tobacco smoker: No      Systolic Blood Pressure: 192 mmHg      Is BP treated: Yes      HDL Cholesterol: 84.4 mg/dL      Total Cholesterol: 222 mg/dL     Assessment/Plan:   1. SVT (supraventricular tachycardia) ([facility])  Referral to Cardiology         Olga Lidia Valentin 69 y.o. female with hx of HTN and mitral valve prolapse/mild MR in 2015 referred for palpitations   She had a prior hx of reported SVT     She has RODRIGUEZ, palpitations, and dizziness   Her murmur  is quit loud on exam, she has intermittent PACs/short SVT on auscultation today  BP is high at home     Plan  Get an echo   Get a zio   Start coreg 6.25 mg bid   Consider doubling valsartan if BP continues uncontrolled    Stress CMR to exclude ischemia and eval MR severity/scar from possible mitral annulus dysjunction (if present)      Time Spent: I spent  minutes reviewing medical testing, obtaining medical history and counselling and educating on diagnosis and documenting clinical encounter.         ____________________________________________________________  Madelin Rodriguez MD   of Medicine  Division of Cardiovascular Medicine   [facility] [place]  [facility]

## 2024-11-29 ENCOUNTER — HOSPITAL ENCOUNTER (OUTPATIENT)
Dept: CARDIOLOGY | Facility: HOSPITAL | Age: 69
Discharge: HOME | End: 2024-11-29
Payer: MEDICARE

## 2024-11-29 DIAGNOSIS — I34.1 MITRAL VALVE PROLAPSE: ICD-10-CM

## 2024-11-29 DIAGNOSIS — R42 DIZZINESS: ICD-10-CM

## 2024-11-29 DIAGNOSIS — R00.2 PALPITATIONS: ICD-10-CM

## 2024-11-29 DIAGNOSIS — I10 PRIMARY HYPERTENSION: ICD-10-CM

## 2024-11-29 DIAGNOSIS — R06.09 DOE (DYSPNEA ON EXERTION): ICD-10-CM

## 2024-11-29 DIAGNOSIS — I47.10 SVT (SUPRAVENTRICULAR TACHYCARDIA) (CMS-HCC): ICD-10-CM

## 2024-11-29 PROCEDURE — 93246 EXT ECG>7D<15D RECORDING: CPT

## 2024-11-29 PROCEDURE — 93306 TTE W/DOPPLER COMPLETE: CPT

## 2024-12-02 LAB
AORTIC VALVE PEAK VELOCITY: 1.41 M/S
AV PEAK GRADIENT: 8 MMHG
AVA (PEAK VEL): 2.31 CM2
EJECTION FRACTION APICAL 4 CHAMBER: 67.7
EJECTION FRACTION: 58 %
LEFT ATRIUM VOLUME AREA LENGTH INDEX BSA: 24.9 ML/M2
LEFT VENTRICLE INTERNAL DIMENSION DIASTOLE: 3.84 CM (ref 3.5–6)
LEFT VENTRICULAR OUTFLOW TRACT DIAMETER: 1.9 CM
MITRAL VALVE E/A RATIO: 1.01
RIGHT VENTRICLE FREE WALL PEAK S': 15.1 CM/S
RIGHT VENTRICLE PEAK SYSTOLIC PRESSURE: 22.4 MMHG
TRICUSPID ANNULAR PLANE SYSTOLIC EXCURSION: 2.3 CM

## 2024-12-09 ENCOUNTER — TELEPHONE (OUTPATIENT)
Dept: CARDIOLOGY | Facility: HOSPITAL | Age: 69
End: 2024-12-09
Payer: MEDICARE

## 2024-12-09 DIAGNOSIS — I10 PRIMARY HYPERTENSION: ICD-10-CM

## 2024-12-09 DIAGNOSIS — I47.10 SVT (SUPRAVENTRICULAR TACHYCARDIA) (CMS-HCC): ICD-10-CM

## 2024-12-09 RX ORDER — DILTIAZEM HYDROCHLORIDE 180 MG/1
180 CAPSULE, COATED, EXTENDED RELEASE ORAL DAILY
Qty: 30 CAPSULE | Refills: 11 | Status: SHIPPED | OUTPATIENT
Start: 2024-12-09 | End: 2025-12-09

## 2025-01-02 ENCOUNTER — HOSPITAL ENCOUNTER (OUTPATIENT)
Dept: RADIOLOGY | Facility: HOSPITAL | Age: 70
Discharge: HOME | End: 2025-01-02
Payer: MEDICARE

## 2025-01-02 ENCOUNTER — HOSPITAL ENCOUNTER (OUTPATIENT)
Dept: CARDIOLOGY | Facility: HOSPITAL | Age: 70
Discharge: HOME | End: 2025-01-02
Payer: MEDICARE

## 2025-01-02 DIAGNOSIS — I47.10 SVT (SUPRAVENTRICULAR TACHYCARDIA) (CMS-HCC): Primary | ICD-10-CM

## 2025-01-02 DIAGNOSIS — I47.10 SVT (SUPRAVENTRICULAR TACHYCARDIA) (CMS-HCC): ICD-10-CM

## 2025-01-02 DIAGNOSIS — I34.1 MVP (MITRAL VALVE PROLAPSE): ICD-10-CM

## 2025-01-02 DIAGNOSIS — I34.1 MVP (MITRAL VALVE PROLAPSE): Primary | ICD-10-CM

## 2025-01-02 DIAGNOSIS — R06.09 DOE (DYSPNEA ON EXERTION): ICD-10-CM

## 2025-01-02 LAB
ATRIAL RATE: 69 BPM
P AXIS: 75 DEGREES
P OFFSET: 174 MS
P ONSET: 130 MS
PR INTERVAL: 186 MS
Q ONSET: 223 MS
QRS COUNT: 12 BEATS
QRS DURATION: 84 MS
QT INTERVAL: 408 MS
QTC CALCULATION(BAZETT): 437 MS
QTC FREDERICIA: 427 MS
R AXIS: 15 DEGREES
T AXIS: 54 DEGREES
T OFFSET: 427 MS
VENTRICULAR RATE: 69 BPM

## 2025-01-02 PROCEDURE — A9575 INJ GADOTERATE MEGLUMI 0.1ML: HCPCS | Performed by: STUDENT IN AN ORGANIZED HEALTH CARE EDUCATION/TRAINING PROGRAM

## 2025-01-02 PROCEDURE — 75563 CARD MRI W/STRESS IMG & DYE: CPT | Performed by: INTERNAL MEDICINE

## 2025-01-02 PROCEDURE — 2550000001 HC RX 255 CONTRASTS: Performed by: STUDENT IN AN ORGANIZED HEALTH CARE EDUCATION/TRAINING PROGRAM

## 2025-01-02 PROCEDURE — 2500000004 HC RX 250 GENERAL PHARMACY W/ HCPCS (ALT 636 FOR OP/ED): Performed by: STUDENT IN AN ORGANIZED HEALTH CARE EDUCATION/TRAINING PROGRAM

## 2025-01-02 PROCEDURE — 93005 ELECTROCARDIOGRAM TRACING: CPT

## 2025-01-02 PROCEDURE — 75563 CARD MRI W/STRESS IMG & DYE: CPT

## 2025-01-02 RX ORDER — REGADENOSON 0.08 MG/ML
0.4 INJECTION, SOLUTION INTRAVENOUS
Status: COMPLETED | OUTPATIENT
Start: 2025-01-02 | End: 2025-01-02

## 2025-01-02 RX ORDER — GADOTERATE MEGLUMINE 376.9 MG/ML
16 INJECTION INTRAVENOUS
Status: COMPLETED | OUTPATIENT
Start: 2025-01-02 | End: 2025-01-02

## 2025-01-02 RX ORDER — AMINOPHYLLINE 25 MG/ML
100 INJECTION, SOLUTION INTRAVENOUS ONCE
Status: COMPLETED | OUTPATIENT
Start: 2025-01-02 | End: 2025-01-02

## 2025-01-02 RX ADMIN — GADOTERATE MEGLUMINE 16 ML: 376.9 INJECTION INTRAVENOUS at 10:03

## 2025-01-02 RX ADMIN — REGADENOSON 0.4 MG: 0.08 INJECTION, SOLUTION INTRAVENOUS at 10:05

## 2025-01-02 RX ADMIN — AMINOPHYLLINE 100 MG: 25 INJECTION, SOLUTION INTRAVENOUS at 10:10

## 2025-01-02 NOTE — NURSING NOTE
MRI STRESS    Stress protocol:  Regadenoson    Regadenoson Dose: 0.4mg    Aminophylline Dose:  100mg      Resting Vitals:    /72    HR 58    SpO2 98%    Resting ECG: NSB. Denies chest discomfort. Lungs clear.      Stress:    0.4mg Regadenoson IV push:    1 min: 150/80-99-98%. C/o sob and palpitations. Denies chest discomfort.    3 min: 155/80-94-98%. Sob and palpitations unchanged. Denies chest discomfort.      Reversal/Recovery    100mg Aminophylline IV push:     1 min:  /54-58-98%. Sob and palpitations resolved. Denies chest discomfort.    2 min:  /86-50-98%. Denies any symptoms.     4 min:  /69-67-98%. Denies any symptoms.    6 min:  /77-69-99%. Denies any symptoms.    8 min: 145/75-62-99%. Denies any symptoms.

## 2025-01-03 ENCOUNTER — HOSPITAL ENCOUNTER (OUTPATIENT)
Dept: CARDIOLOGY | Facility: HOSPITAL | Age: 70
Discharge: HOME | End: 2025-01-03
Payer: MEDICARE

## 2025-01-03 LAB
ATRIAL RATE: 58 BPM
P AXIS: 79 DEGREES
P OFFSET: 182 MS
P ONSET: 134 MS
PR INTERVAL: 176 MS
Q ONSET: 222 MS
QRS COUNT: 10 BEATS
QRS DURATION: 76 MS
QT INTERVAL: 412 MS
QTC CALCULATION(BAZETT): 404 MS
QTC FREDERICIA: 407 MS
R AXIS: -1 DEGREES
T AXIS: 50 DEGREES
T OFFSET: 428 MS
VENTRICULAR RATE: 58 BPM

## 2025-01-03 PROCEDURE — 93005 ELECTROCARDIOGRAM TRACING: CPT

## 2025-01-06 ENCOUNTER — OFFICE VISIT (OUTPATIENT)
Dept: CARDIOLOGY | Facility: CLINIC | Age: 70
End: 2025-01-06
Payer: MEDICARE

## 2025-01-06 VITALS
WEIGHT: 108 LBS | HEART RATE: 77 BPM | SYSTOLIC BLOOD PRESSURE: 182 MMHG | HEIGHT: 62 IN | OXYGEN SATURATION: 95 % | BODY MASS INDEX: 19.88 KG/M2 | DIASTOLIC BLOOD PRESSURE: 79 MMHG

## 2025-01-06 DIAGNOSIS — I47.10 SVT (SUPRAVENTRICULAR TACHYCARDIA) (CMS-HCC): Primary | ICD-10-CM

## 2025-01-06 DIAGNOSIS — I34.1 MITRAL VALVE PROLAPSE: ICD-10-CM

## 2025-01-06 DIAGNOSIS — I10 PRIMARY HYPERTENSION: ICD-10-CM

## 2025-01-06 PROCEDURE — 3008F BODY MASS INDEX DOCD: CPT | Performed by: STUDENT IN AN ORGANIZED HEALTH CARE EDUCATION/TRAINING PROGRAM

## 2025-01-06 PROCEDURE — 1159F MED LIST DOCD IN RCRD: CPT | Performed by: STUDENT IN AN ORGANIZED HEALTH CARE EDUCATION/TRAINING PROGRAM

## 2025-01-06 PROCEDURE — G2211 COMPLEX E/M VISIT ADD ON: HCPCS | Performed by: STUDENT IN AN ORGANIZED HEALTH CARE EDUCATION/TRAINING PROGRAM

## 2025-01-06 PROCEDURE — 99214 OFFICE O/P EST MOD 30 MIN: CPT | Performed by: STUDENT IN AN ORGANIZED HEALTH CARE EDUCATION/TRAINING PROGRAM

## 2025-01-06 PROCEDURE — 3078F DIAST BP <80 MM HG: CPT | Performed by: STUDENT IN AN ORGANIZED HEALTH CARE EDUCATION/TRAINING PROGRAM

## 2025-01-06 PROCEDURE — 3077F SYST BP >= 140 MM HG: CPT | Performed by: STUDENT IN AN ORGANIZED HEALTH CARE EDUCATION/TRAINING PROGRAM

## 2025-01-06 RX ORDER — DILTIAZEM HYDROCHLORIDE 240 MG/1
240 CAPSULE, COATED, EXTENDED RELEASE ORAL DAILY
Qty: 90 CAPSULE | Refills: 3 | Status: SHIPPED | OUTPATIENT
Start: 2025-01-06 | End: 2026-01-06

## 2025-01-06 RX ORDER — FLECAINIDE ACETATE 100 MG/1
100 TABLET ORAL 2 TIMES DAILY PRN
Qty: 30 TABLET | Refills: 0 | Status: SHIPPED | OUTPATIENT
Start: 2025-01-06 | End: 2025-02-05

## 2025-01-06 ASSESSMENT — PATIENT HEALTH QUESTIONNAIRE - PHQ9
SUM OF ALL RESPONSES TO PHQ9 QUESTIONS 1 AND 2: 0
1. LITTLE INTEREST OR PLEASURE IN DOING THINGS: NOT AT ALL
2. FEELING DOWN, DEPRESSED OR HOPELESS: NOT AT ALL

## 2025-01-06 NOTE — PROGRESS NOTES
Primary Care Physician: Gunnar Johnson MD   Date of Visit: 01/06/2025  9:20 AM EST  Type of Visit: follow up      Chief Complaint:  Chief Complaint   Patient presents with    Follow-up    Results     Holter, stress, echo        HPI  Olga Lidia Valentin 69 y.o. female with hx of reported hx of SVT, HTN and mitral valve prolapse/mild MR in 2015 referred for palpitations.    Here today for follow up     She developed insomnia from coreg then switched to diltiazem       She still feels palpitations, she is very anxious about it. She wants to be evaluated for possible ablation.   No le edema  No braswell  No bleeding issues   No chest pain    BP is 130-150s range    Review of Systems   Review of Systems   12 points review of systems are negative expect for the above    Social History:  Social History     Socioeconomic History    Marital status:      Spouse name: Not on file    Number of children: Not on file    Years of education: Not on file    Highest education level: Not on file   Occupational History    Not on file   Tobacco Use    Smoking status: Former     Current packs/day: 1.00     Average packs/day: 1 pack/day for 5.0 years (5.0 ttl pk-yrs)     Types: Cigarettes    Smokeless tobacco: Never   Vaping Use    Vaping status: Never Used   Substance and Sexual Activity    Alcohol use: Yes     Alcohol/week: 5.0 standard drinks of alcohol     Types: 5 Glasses of wine per week    Drug use: Never    Sexual activity: Defer   Other Topics Concern    Not on file   Social History Narrative    Not on file     Social Drivers of Health     Financial Resource Strain: Not on file   Food Insecurity: Not on file   Transportation Needs: Not on file   Physical Activity: Not on file   Stress: Not on file   Social Connections: Not on file   Intimate Partner Violence: Not on file   Housing Stability: Not on file        Past Medical History:  Past Medical History:   Diagnosis Date    Abnormal results of kidney function studies  06/21/2013    Renal function test abnormal    Asymptomatic menopausal state 03/09/2015    History of menopause    Asymptomatic menopausal state 03/12/2018    Postmenopausal status    Encounter for other preprocedural examination 03/09/2015    Preop examination    Encounter for screening for other viral diseases 10/16/2018    Need for hepatitis C screening test    Insomnia, unspecified 09/24/2015    Persistent insomnia    Other conditions influencing health status     No significant past medical history    Other long term (current) drug therapy 10/16/2018    Long term use of drug    Other specified noninflammatory disorders of vulva and perineum 08/17/2016    Vulvar lesion    Other tear of medial meniscus, current injury, right knee, subsequent encounter 10/10/2018    Tear of medial meniscus of right knee, current, unspecified tear type, subsequent encounter    Pain in right knee 11/02/2018    Right knee pain, unspecified chronicity    Peripheral tear of medial meniscus, current injury, right knee, initial encounter 10/26/2018    Peripheral tear of medial meniscus of right knee, unspecified whether old or current tear, initial encounter    Personal history of other diseases of the female genital tract 08/17/2016    History of postmenopausal bleeding    Personal history of other drug therapy 09/26/2017    History of influenza vaccination    Personal history of other endocrine, nutritional and metabolic disease 10/02/2014    History of hypothyroidism    Personal history of other medical treatment 03/12/2018    History of screening mammography    Personal history of other specified conditions 06/25/2013    History of urinary frequency    Personal history of other specified conditions 09/17/2013    History of abnormal weight loss    Personal history of urinary (tract) infections 06/25/2013    History of urinary tract infection    Postmenopausal atrophic vaginitis 09/26/2017    Atrophic vaginitis       Past Surgical  "History:  Past Surgical History:   Procedure Laterality Date    APPENDECTOMY  03/09/2015    Appendectomy    HYSTERECTOMY  03/09/2015    Hysterectomy    OTHER SURGICAL HISTORY  08/30/2022    Knee surgery       Family History:  Family History   Problem Relation Name Age of Onset    Endometrial cancer Mother      Breast cancer Sister          Objective:       11/23/2024     1:00 PM 11/23/2024     1:15 PM 11/23/2024     1:30 PM 11/26/2024     9:52 AM 1/2/2025     8:39 AM 1/6/2025     9:16 AM 1/6/2025     9:19 AM   Vitals   Systolic 146   192  190 182   Diastolic 77   82  64 79   BP Location      Left arm Left arm   Heart Rate 60 71 61 75  77    Resp 17 13 19       Height     1.58 m (5' 2.21\") 1.575 m (5' 2\")    Weight (lb)    105.6 105.82 108    BMI    19.31 kg/m2 19.23 kg/m2 19.75 kg/m2    BSA (m2)    1.45 m2 1.45 m2 1.46 m2    Visit Report    Report  Report Report      Constitutional:       Appearance: Healthy appearance. Not in distress.   Neck:      Vascular: No JVR. JVD normal.   Pulmonary:      Effort: Pulmonary effort is normal.      Breath sounds: Normal breath sounds. No wheezing. No rhonchi. No rales.   Chest:      Chest wall: Not tender to palpatation.   Cardiovascular:      Normal rate. Regular rhythm. Normal S1. Normal S2.       Murmurs: There is no murmur.      No gallop.  N No rub.   Pulses:     Intact distal pulses.   Edema:     Peripheral edema absent.   Abdominal:      General: Bowel sounds are normal.      Palpations: Abdomen is soft.      Tenderness: There is no abdominal tenderness.   Musculoskeletal: Normal range of motion.         General: No tenderness.   Skin:     General: Skin is warm and dry.   Neurological:      General: No focal deficit present.      Mental Status: Alert and oriented to person, place and time.     Allergies:  No Known Allergies    Medications:  Current Outpatient Medications   Medication Instructions    calcium carbonate (CALTRATE 600 ORAL) Take by mouth.    dicyclomine " (Bentyl) 20 mg tablet TAKE 1/2 (ONE-HALF) TABLET BY MOUTH TWICE DAILY AS NEEDED FOR BOWEL SPASM.    dilTIAZem CD (CARTIA XT) 180 mg, oral, Daily    multivitamin capsule 1 capsule, Daily    valsartan (DIOVAN) 160 mg, oral, Daily        Labs and Imaging:     Lab Results   Component Value Date    WBC 4.5 11/23/2024    HGB 16.0 11/23/2024    HCT 48.7 (H) 11/23/2024     11/23/2024    CHOL 222 (H) 04/26/2024    TRIG 75 04/26/2024    HDL 84.4 04/26/2024    ALT 27 11/23/2024    AST 29 11/23/2024     11/23/2024    K 4.0 11/23/2024     11/23/2024    CREATININE 0.93 11/23/2024    BUN 15 11/23/2024    CO2 29 11/23/2024    TSH 0.95 11/23/2024         Echocardiogram: No results found for this or any previous visit from the past 1825 days.    Stress Testing: No results found for this or any previous visit from the past 1825 days.    Cardiac Catheterization: No results found for this or any previous visit from the past 1825 days.    Cardiac Scoring: No results found for this or any previous visit from the past 1825 days.    AAA : No results found for this or any previous visit from the past 1825 days.    OTHER: No results found for this or any previous visit from the past 1825 days.      The 10-year ASCVD risk score ([place] DK, et al., 2019) is: 16.3%    Values used to calculate the score:      Age: 69 years      Sex: Female      Is Non- : No      Diabetic: No      Tobacco smoker: No      Systolic Blood Pressure: 182 mmHg      Is BP treated: Yes      HDL Cholesterol: 84.4 mg/dL      Total Cholesterol: 222 mg/dL     Assessment/Plan:   No diagnosis found.   Olga Lidia Valentin 69 y.o. female with hx of reported hx of SVT, HTN and mitral valve prolapse/mild MR in 2015 referred for palpitations.      Stress CMR unremarkable     Echo with mild MR with mitral valve prolapse     Zio with short SVT episodes, longest 18 beats (total 10 episodes), symptomatic. <1% PVCs and PACs.      She developed  insomnia from coreg then switched to diltiazem      Plan   Increase diltiazem 240 mg every day  Will give her Flecanide 100 mg bid prn, 30 tabs, only use if she gets long SVT episode, she will buy a smart watch to monitor herself   She is requesting an EP referral as she is considering an ablation  Repeat echo after 3 yrs      Time Spent: I spent 30 minutes reviewing medical testing, obtaining medical history and counselling and educating on diagnosis and documenting clinical encounter.         ____________________________________________________________  Madelin Rodriguez MD   of Medicine  Division of Cardiovascular Medicine   [facility] [place]  [facility]

## 2025-01-10 LAB
ATRIAL RATE: 58 BPM
ATRIAL RATE: 69 BPM
P AXIS: 75 DEGREES
P AXIS: 79 DEGREES
P OFFSET: 174 MS
P OFFSET: 182 MS
P ONSET: 130 MS
P ONSET: 134 MS
PR INTERVAL: 176 MS
PR INTERVAL: 186 MS
Q ONSET: 222 MS
Q ONSET: 223 MS
QRS COUNT: 10 BEATS
QRS COUNT: 12 BEATS
QRS DURATION: 76 MS
QRS DURATION: 84 MS
QT INTERVAL: 408 MS
QT INTERVAL: 412 MS
QTC CALCULATION(BAZETT): 404 MS
QTC CALCULATION(BAZETT): 437 MS
QTC FREDERICIA: 407 MS
QTC FREDERICIA: 427 MS
R AXIS: -1 DEGREES
R AXIS: 15 DEGREES
T AXIS: 50 DEGREES
T AXIS: 54 DEGREES
T OFFSET: 427 MS
T OFFSET: 428 MS
VENTRICULAR RATE: 58 BPM
VENTRICULAR RATE: 69 BPM

## 2025-01-27 DIAGNOSIS — I10 HYPERTENSION, UNSPECIFIED TYPE: ICD-10-CM

## 2025-01-27 RX ORDER — VALSARTAN 160 MG/1
160 TABLET ORAL DAILY
Qty: 90 TABLET | Refills: 0 | Status: SHIPPED | OUTPATIENT
Start: 2025-01-27

## 2025-02-10 NOTE — PROGRESS NOTES
I had the pleasure seeing Olga Lidia Valentin     No chief complaint on file.     OUTPATIENT CONSULTATION: Cardiac Electrophysiology  DOS: February 12, 2025  REASON: SVT ablation consult  REFERRING:  Madelin Rodriguez MD     Current Outpatient Medications   Medication Instructions    calcium carbonate (CALTRATE 600 ORAL) Take by mouth.    dicyclomine (Bentyl) 20 mg tablet TAKE 1/2 (ONE-HALF) TABLET BY MOUTH TWICE DAILY AS NEEDED FOR BOWEL SPASM.    dilTIAZem CD (CARDIZEM CD) 240 mg, oral, Daily    flecainide (TAMBOCOR) 100 mg, oral, 2 times daily PRN    multivitamin capsule 1 capsule, Daily    valsartan (DIOVAN) 160 mg, oral, Daily      Olga Lidia Valentin is a 69 y.o. with:     Pmh includes Leukopenia, HTN, MVP (2015), persistent Insomnia, Dizziness, RODRIGUEZ, and Osteoporosis.      CARDIAC HX     Palpitations  SVT - treated with Flecainide 100 mg twice daily as needed.        CARDIAC TESTING:    -ECHO/ TTE:  (11/29/24) LVEF 55-60%.  No sig valve disease noted.       -Monitor:  14d ZIO (Nov 2024) showed 1st degree AVB, 10 runs SVT longest lasting 8 beats.  HR range  bpm with HR Avg 59 bpm.  Rare PACs and PVCs (<1% burden)    Objective   Physical Exam  Constitutional: alert and in no acute distress.   Eyes: no erythema, swelling or discharge from the eye .   Neck: neck is supple, symmetric, trachea midline, no masses .   Pulmonary: no increased work of breathing or signs of respiratory distress  and lungs clear to auscultation.    Cardiovascular:  regular rhythm, normal S1 and S2, no murmurs , pedal pulses 2+ bilaterally  and no edema .   Abdomen: abdomen non-tender, no masses .   Skin: skin warm and dry, normal skin turgor .   Psychiatric judgment and insight is normal  and oriented to person, place and time .             Assessment/Plan   SVT- she describes episodes of sudden onset and sudden offset. Often at night. With very rapid heart rates. She did visit the ER but by then the arrhythmia had subsided. Symptoms are  better on Diltiazem but she still has them. We discussed the options - 1. Observation 2. Medical therapy and 3. EPS and RFA. All the options were explained in great details, we are proceeding with definitive treatment on March 31, 2025.

## 2025-02-12 ENCOUNTER — OFFICE VISIT (OUTPATIENT)
Dept: CARDIOLOGY | Facility: CLINIC | Age: 70
End: 2025-02-12
Payer: MEDICARE

## 2025-02-12 ENCOUNTER — HOSPITAL ENCOUNTER (OUTPATIENT)
Facility: HOSPITAL | Age: 70
Setting detail: OUTPATIENT SURGERY
End: 2025-02-12
Attending: INTERNAL MEDICINE | Admitting: INTERNAL MEDICINE
Payer: MEDICARE

## 2025-02-12 VITALS
BODY MASS INDEX: 20.06 KG/M2 | SYSTOLIC BLOOD PRESSURE: 176 MMHG | OXYGEN SATURATION: 100 % | HEART RATE: 72 BPM | WEIGHT: 109 LBS | HEIGHT: 62 IN | DIASTOLIC BLOOD PRESSURE: 74 MMHG

## 2025-02-12 DIAGNOSIS — I47.10 SVT (SUPRAVENTRICULAR TACHYCARDIA) (CMS-HCC): ICD-10-CM

## 2025-02-12 LAB
ATRIAL RATE: 67 BPM
P AXIS: 81 DEGREES
P OFFSET: 190 MS
P ONSET: 135 MS
PR INTERVAL: 178 MS
Q ONSET: 224 MS
QRS COUNT: 11 BEATS
QRS DURATION: 86 MS
QT INTERVAL: 400 MS
QTC CALCULATION(BAZETT): 422 MS
QTC FREDERICIA: 415 MS
R AXIS: 88 DEGREES
T AXIS: 72 DEGREES
T OFFSET: 424 MS
VENTRICULAR RATE: 67 BPM

## 2025-02-12 PROCEDURE — 3008F BODY MASS INDEX DOCD: CPT | Performed by: INTERNAL MEDICINE

## 2025-02-12 PROCEDURE — 99215 OFFICE O/P EST HI 40 MIN: CPT | Performed by: INTERNAL MEDICINE

## 2025-02-12 PROCEDURE — 3078F DIAST BP <80 MM HG: CPT | Performed by: INTERNAL MEDICINE

## 2025-02-12 PROCEDURE — 99205 OFFICE O/P NEW HI 60 MIN: CPT | Performed by: INTERNAL MEDICINE

## 2025-02-12 PROCEDURE — 1036F TOBACCO NON-USER: CPT | Performed by: INTERNAL MEDICINE

## 2025-02-12 PROCEDURE — 1160F RVW MEDS BY RX/DR IN RCRD: CPT | Performed by: INTERNAL MEDICINE

## 2025-02-12 PROCEDURE — 1159F MED LIST DOCD IN RCRD: CPT | Performed by: INTERNAL MEDICINE

## 2025-02-12 PROCEDURE — 93005 ELECTROCARDIOGRAM TRACING: CPT | Performed by: INTERNAL MEDICINE

## 2025-02-12 PROCEDURE — G2211 COMPLEX E/M VISIT ADD ON: HCPCS | Performed by: INTERNAL MEDICINE

## 2025-02-12 PROCEDURE — 3077F SYST BP >= 140 MM HG: CPT | Performed by: INTERNAL MEDICINE

## 2025-02-12 ASSESSMENT — ENCOUNTER SYMPTOMS: DEPRESSION: 0

## 2025-03-05 DIAGNOSIS — Z01.818 PREOP TESTING: ICD-10-CM

## 2025-03-19 DIAGNOSIS — Z01.818 PREOP TESTING: ICD-10-CM

## 2025-04-14 DIAGNOSIS — M81.6 LOCALIZED OSTEOPOROSIS WITHOUT CURRENT PATHOLOGICAL FRACTURE: Primary | ICD-10-CM

## 2025-04-24 DIAGNOSIS — I10 HYPERTENSION, UNSPECIFIED TYPE: ICD-10-CM

## 2025-04-24 RX ORDER — VALSARTAN 160 MG/1
160 TABLET ORAL DAILY
Qty: 90 TABLET | Refills: 0 | Status: SHIPPED | OUTPATIENT
Start: 2025-04-24

## 2025-05-02 LAB
ANION GAP SERPL CALCULATED.4IONS-SCNC: 7 MMOL/L (CALC) (ref 7–17)
BUN SERPL-MCNC: 17 MG/DL (ref 7–25)
BUN/CREAT SERPL: NORMAL (CALC) (ref 6–22)
CALCIUM SERPL-MCNC: 9.5 MG/DL (ref 8.6–10.4)
CHLORIDE SERPL-SCNC: 104 MMOL/L (ref 98–110)
CO2 SERPL-SCNC: 29 MMOL/L (ref 20–32)
CREAT SERPL-MCNC: 0.9 MG/DL (ref 0.5–1.05)
EGFRCR SERPLBLD CKD-EPI 2021: 70 ML/MIN/1.73M2
ERYTHROCYTE [DISTWIDTH] IN BLOOD BY AUTOMATED COUNT: 11.9 % (ref 11–15)
GLUCOSE SERPL-MCNC: 94 MG/DL (ref 65–99)
HCT VFR BLD AUTO: 42.4 % (ref 35–45)
HGB BLD-MCNC: 14.1 G/DL (ref 11.7–15.5)
MCH RBC QN AUTO: 31.2 PG (ref 27–33)
MCHC RBC AUTO-ENTMCNC: 33.3 G/DL (ref 32–36)
MCV RBC AUTO: 93.8 FL (ref 80–100)
PLATELET # BLD AUTO: 224 THOUSAND/UL (ref 140–400)
PMV BLD REES-ECKER: 10.7 FL (ref 7.5–12.5)
POTASSIUM SERPL-SCNC: 4.3 MMOL/L (ref 3.5–5.3)
RBC # BLD AUTO: 4.52 MILLION/UL (ref 3.8–5.1)
SODIUM SERPL-SCNC: 140 MMOL/L (ref 135–146)
WBC # BLD AUTO: 3.6 THOUSAND/UL (ref 3.8–10.8)

## 2025-05-07 ENCOUNTER — APPOINTMENT (OUTPATIENT)
Dept: PRIMARY CARE | Facility: CLINIC | Age: 70
End: 2025-05-07
Payer: MEDICARE

## 2025-05-07 VITALS
HEART RATE: 51 BPM | TEMPERATURE: 96.9 F | SYSTOLIC BLOOD PRESSURE: 130 MMHG | DIASTOLIC BLOOD PRESSURE: 70 MMHG | BODY MASS INDEX: 20.3 KG/M2 | OXYGEN SATURATION: 99 % | WEIGHT: 111 LBS

## 2025-05-07 DIAGNOSIS — K58.9 IRRITABLE BOWEL SYNDROME, UNSPECIFIED TYPE: ICD-10-CM

## 2025-05-07 DIAGNOSIS — I47.10 PSVT (PAROXYSMAL SUPRAVENTRICULAR TACHYCARDIA) (CMS-HCC): ICD-10-CM

## 2025-05-07 DIAGNOSIS — I10 HYPERTENSION, UNSPECIFIED TYPE: ICD-10-CM

## 2025-05-07 DIAGNOSIS — I34.1 MITRAL VALVE PROLAPSE: Primary | ICD-10-CM

## 2025-05-07 PROCEDURE — G2211 COMPLEX E/M VISIT ADD ON: HCPCS | Performed by: INTERNAL MEDICINE

## 2025-05-07 PROCEDURE — 1036F TOBACCO NON-USER: CPT | Performed by: INTERNAL MEDICINE

## 2025-05-07 PROCEDURE — 3075F SYST BP GE 130 - 139MM HG: CPT | Performed by: INTERNAL MEDICINE

## 2025-05-07 PROCEDURE — 1159F MED LIST DOCD IN RCRD: CPT | Performed by: INTERNAL MEDICINE

## 2025-05-07 PROCEDURE — 99214 OFFICE O/P EST MOD 30 MIN: CPT | Performed by: INTERNAL MEDICINE

## 2025-05-07 PROCEDURE — 1160F RVW MEDS BY RX/DR IN RCRD: CPT | Performed by: INTERNAL MEDICINE

## 2025-05-07 PROCEDURE — 3078F DIAST BP <80 MM HG: CPT | Performed by: INTERNAL MEDICINE

## 2025-05-07 RX ORDER — DICYCLOMINE HYDROCHLORIDE 20 MG/1
TABLET ORAL
Qty: 90 TABLET | Refills: 1 | Status: SHIPPED | OUTPATIENT
Start: 2025-05-07

## 2025-05-07 ASSESSMENT — ENCOUNTER SYMPTOMS
HYPERTENSION: 1
SORE THROAT: 0
PALPITATIONS: 0
BLOOD IN STOOL: 0
ABDOMINAL PAIN: 0
DIARRHEA: 0
DIFFICULTY URINATING: 0
FATIGUE: 0
DIZZINESS: 0
HEADACHES: 0
SINUS PAIN: 0
BRUISES/BLEEDS EASILY: 0
COUGH: 0
WHEEZING: 0
FEVER: 0
ARTHRALGIAS: 0
UNEXPECTED WEIGHT CHANGE: 0

## 2025-05-07 NOTE — PROGRESS NOTES
Subjective   Patient ID: Olga Lidia Valentin is a 69 y.o. female who presents for Hypertension and ER Follow-up (ER in November, seeing cardiology ).    - Recent abdominal care consultation ER records reviewed cardiology consult reviewed  -Recent history of recurrent SVT seen in the emergency room follow-up cardiology workup which was negative underlying mitral valve prolapse and uncontrolled hypertension  Seen by EP study patient declined ablation at this time symptoms are controlled  - Status post SVT patient's sinus no recurrence continue monitoring counseled about diet exercise sleep hygiene avoid caffeine avoid any alcohol  -Hypertension uncontrolled patient now on diltiazem to 240 mg daily continue with valsartan 160 mg daily    - Hypercholesterolemia maximize medical management continue low-fat diet  -- Screening for colon cancer obtained November 2020 2 repeat in November 2027 5 years     -- Mitral valve prolapse, compensated no chest pain repeat 2D echo in 3 years  - COPD stable no recurrence no shortness of breath no palpitation no leg swelling no orthopnea  --Hypertension improved continue with valsartan continue low-salt diet  -  Irritable bowel syndrome continue with dicyclomine as needed high-fiber diet symptoms are controlled continue with high-fiber diet  Recheck patient blood pressure again in follow-up in 3 months    Hypertension  Pertinent negatives include no chest pain, headaches or palpitations.   ER Follow-up  Pertinent negatives include no abdominal pain, arthralgias, chest pain, congestion, coughing, fatigue, fever, headaches, rash or sore throat.          Review of Systems   Constitutional:  Negative for fatigue, fever and unexpected weight change.   HENT:  Negative for congestion, ear discharge, ear pain, mouth sores, sinus pain and sore throat.    Eyes:  Negative for visual disturbance.   Respiratory:  Negative for cough and wheezing.    Cardiovascular:  Negative for chest pain,  "palpitations and leg swelling.   Gastrointestinal:  Negative for abdominal pain, blood in stool and diarrhea.   Genitourinary:  Negative for difficulty urinating.   Musculoskeletal:  Negative for arthralgias.   Skin:  Negative for rash.   Neurological:  Negative for dizziness and headaches.   Hematological:  Does not bruise/bleed easily.   Psychiatric/Behavioral:  Negative for behavioral problems.    All other systems reviewed and are negative.      Objective   No results found for: \"HGBA1C\"   /70   Pulse 51   Temp 36.1 °C (96.9 °F)   Wt 50.3 kg (111 lb)   SpO2 99%   BMI 20.30 kg/m²     Physical Exam  Vitals and nursing note reviewed.   Constitutional:       Appearance: Normal appearance.   HENT:      Head: Normocephalic.      Nose: Nose normal.   Eyes:      Conjunctiva/sclera: Conjunctivae normal.      Pupils: Pupils are equal, round, and reactive to light.   Cardiovascular:      Rate and Rhythm: Regular rhythm.   Pulmonary:      Effort: Pulmonary effort is normal.      Breath sounds: Normal breath sounds.   Abdominal:      General: Abdomen is flat.      Palpations: Abdomen is soft.   Musculoskeletal:      Cervical back: Neck supple.   Skin:     General: Skin is warm.   Neurological:      General: No focal deficit present.      Mental Status: She is oriented to person, place, and time.   Psychiatric:         Mood and Affect: Mood normal.         Assessment/Plan   Olga Lidia was seen today for hypertension and er follow-up.  Diagnoses and all orders for this visit:  Mitral valve prolapse (Primary)  Irritable bowel syndrome, unspecified type  Hypertension, unspecified type  PSVT (paroxysmal supraventricular tachycardia) ([facility])  Other orders  -     Follow Up In Primary Care - Established    Recent abdominal care consultation ER records reviewed cardiology consult reviewed  -Recent history of recurrent SVT seen in the emergency room follow-up cardiology workup which was negative underlying mitral valve " prolapse and uncontrolled hypertension  Seen by EP study patient declined ablation at this time symptoms are controlled  - Status post SVT patient's sinus no recurrence continue monitoring counseled about diet exercise sleep hygiene avoid caffeine avoid any alcohol  -Hypertension uncontrolled patient now on diltiazem to 240 mg daily continue with valsartan 160 mg daily    - Hypercholesterolemia maximize medical management continue low-fat diet  -- Screening for colon cancer obtained November 2020 2 repeat in November 2027 5 years     -- Mitral valve prolapse, compensated no chest pain repeat 2D echo in 3 years  - COPD stable no recurrence no shortness of breath no palpitation no leg swelling no orthopnea  --Hypertension improved continue with valsartan continue low-salt diet  -  Irritable bowel syndrome continue with dicyclomine as needed high-fiber diet symptoms are controlled continue with high-fiber diet  Recheck patient blood pressure again in follow-up in 3 months

## 2025-07-07 ENCOUNTER — OFFICE VISIT (OUTPATIENT)
Dept: CARDIOLOGY | Facility: CLINIC | Age: 70
End: 2025-07-07
Payer: MEDICARE

## 2025-07-07 VITALS
OXYGEN SATURATION: 98 % | BODY MASS INDEX: 19.39 KG/M2 | WEIGHT: 106 LBS | DIASTOLIC BLOOD PRESSURE: 74 MMHG | SYSTOLIC BLOOD PRESSURE: 164 MMHG | HEART RATE: 67 BPM

## 2025-07-07 DIAGNOSIS — I47.10 SVT (SUPRAVENTRICULAR TACHYCARDIA): Primary | ICD-10-CM

## 2025-07-07 DIAGNOSIS — R00.2 PALPITATIONS: ICD-10-CM

## 2025-07-07 DIAGNOSIS — I10 PRIMARY HYPERTENSION: ICD-10-CM

## 2025-07-07 DIAGNOSIS — I34.1 MITRAL VALVE PROLAPSE: ICD-10-CM

## 2025-07-07 DIAGNOSIS — I34.0 NONRHEUMATIC MITRAL VALVE REGURGITATION: ICD-10-CM

## 2025-07-07 PROCEDURE — 3078F DIAST BP <80 MM HG: CPT | Performed by: STUDENT IN AN ORGANIZED HEALTH CARE EDUCATION/TRAINING PROGRAM

## 2025-07-07 PROCEDURE — 99214 OFFICE O/P EST MOD 30 MIN: CPT | Performed by: STUDENT IN AN ORGANIZED HEALTH CARE EDUCATION/TRAINING PROGRAM

## 2025-07-07 PROCEDURE — 1159F MED LIST DOCD IN RCRD: CPT | Performed by: STUDENT IN AN ORGANIZED HEALTH CARE EDUCATION/TRAINING PROGRAM

## 2025-07-07 PROCEDURE — 99212 OFFICE O/P EST SF 10 MIN: CPT

## 2025-07-07 PROCEDURE — G2211 COMPLEX E/M VISIT ADD ON: HCPCS | Performed by: STUDENT IN AN ORGANIZED HEALTH CARE EDUCATION/TRAINING PROGRAM

## 2025-07-07 PROCEDURE — 3077F SYST BP >= 140 MM HG: CPT | Performed by: STUDENT IN AN ORGANIZED HEALTH CARE EDUCATION/TRAINING PROGRAM

## 2025-07-07 ASSESSMENT — ENCOUNTER SYMPTOMS
DEPRESSION: 0
OCCASIONAL FEELINGS OF UNSTEADINESS: 0
LOSS OF SENSATION IN FEET: 0

## 2025-07-07 NOTE — PROGRESS NOTES
Primary Care Physician: Gunnar Johnson MD   Date of Visit: 07/07/2025  9:00 AM EDT  Type of Visit: Follow up       Chief Complaint:  Chief Complaint   Patient presents with    Follow-up        HPI  Olga Lidia Valentin 69 y.o. female with hx of reported hx of SVT, HTN and mitral valve prolapse/mild MR in 2015 referred for palpitations. Stress CMR unremarkable. Echo with mild MR with mitral valve prolapse. Zio with short SVT episodes, longest 18 beats (total 10 episodes), symptomatic. <1% PVCs and PACs. She had insomnia from coreg then switched to diltiazem    She gets intermittent palpitation, lot less than before, once or twice, very short episodes. She never used flecainide   No chest pain or angina  No dizziness or syncope  No  sob or braswell   No le edema     She took her pills this AM  BP at home ~130s range, last time checked 2 months ago and she stopped checking it as it was normal      Review of Systems   Review of Systems   12 points review of systems are negative expect for the above    Social History:  Social History     Socioeconomic History    Marital status:      Spouse name: Not on file    Number of children: Not on file    Years of education: Not on file    Highest education level: Not on file   Occupational History    Not on file   Tobacco Use    Smoking status: Former     Current packs/day: 1.00     Average packs/day: 1 pack/day for 5.0 years (5.0 ttl pk-yrs)     Types: Cigarettes    Smokeless tobacco: Never   Vaping Use    Vaping status: Never Used   Substance and Sexual Activity    Alcohol use: Yes     Alcohol/week: 5.0 standard drinks of alcohol     Types: 5 Glasses of wine per week    Drug use: Never    Sexual activity: Defer   Other Topics Concern    Not on file   Social History Narrative    Not on file     Social Drivers of Health     Financial Resource Strain: Not on file   Food Insecurity: Not on file   Transportation Needs: Not on file   Physical Activity: Not on file   Stress: Not on  "file   Social Connections: Not on file   Intimate Partner Violence: Not on file   Housing Stability: Not on file        Past Medical History:  Medical History[1]    Past Surgical History:  Surgical History[2]    Family History:  Family History[3]     Objective:       1/6/2025     9:16 AM 1/6/2025     9:19 AM 2/12/2025     8:51 AM 5/7/2025     9:07 AM 5/7/2025     9:23 AM 5/7/2025     9:54 AM 7/7/2025     9:21 AM   Vitals   Systolic 190 182 176 162 158 130 164   Diastolic 64 79 74 90 80 70 74   BP Location Left arm Left arm Left arm       Heart Rate 77  72 51   67   Temp    36.1 °C (96.9 °F)      Height 1.575 m (5' 2\")  1.575 m (5' 2\")       Weight (lb) 108  109 111   106   BMI 19.75 kg/m2  19.94 kg/m2 20.3 kg/m2   19.39 kg/m2   BSA (m2) 1.46 m2  1.47 m2 1.48 m2   1.45 m2   Visit Report Report Report Report Report Report Report Report      Constitutional:       Appearance: Healthy appearance. Not in distress.   Neck:      Vascular:  JVD normal.   Pulmonary:      Effort: Pulmonary effort is normal.      Breath sounds: Normal breath sounds. No wheezing. No rhonchi. No rales.   Cardiovascular:      Normal rate. Regular rhythm. Normal S1. Normal S2.       Murmurs: There is 2/6 ESM     No gallop.  N No rub.   Pulses:     Intact distal pulses.   Edema:     Peripheral edema absent.   Abdominal:      General: Bowel sounds are normal.      Palpations: Abdomen is soft.      Tenderness: There is no abdominal tenderness.   Musculoskeletal: Normal range of motion.         General: No tenderness.   Skin:     General: Skin is warm and dry.   Neurological:      General: No focal deficit present.      Mental Status: Alert and oriented to person, place and time.   Psychiatry:     Normal Mood     Allergies:  Allergies[4]    Medications:  Current Outpatient Medications   Medication Instructions    calcium carbonate (CALTRATE 600 ORAL) Take by mouth.    dicyclomine (Bentyl) 20 mg tablet TAKE 1/2 (ONE-HALF) TABLET BY MOUTH TWICE DAILY AS " NEEDED FOR BOWEL SPASM.    dilTIAZem CD (CARDIZEM CD) 240 mg, oral, Daily    flecainide (TAMBOCOR) 100 mg, oral, 2 times daily PRN    multivitamin capsule 1 capsule, Daily    valsartan (DIOVAN) 160 mg, oral, Daily        Labs and Imaging:     I reviewed the following labs  Lab Results   Component Value Date    WBC 3.6 (L) 05/02/2025    HGB 14.1 05/02/2025    HCT 42.4 05/02/2025     05/02/2025    CHOL 222 (H) 04/26/2024    TRIG 75 04/26/2024    HDL 84.4 04/26/2024    ALT 27 11/23/2024    AST 29 11/23/2024     05/02/2025    K 4.3 05/02/2025     05/02/2025    CREATININE 0.90 05/02/2025    BUN 17 05/02/2025    CO2 29 05/02/2025    TSH 0.95 11/23/2024       I reviewed the following:  EKG:   Recent Labs     02/12/25  0900 01/03/25  0700 01/02/25  1133   ATRRATE 67 58 69   VENTRATE 67 58 69   PRINT 178 176 186   QRSDUR 86 76 84   QTCFRED 415 407 427   QTCCALCB 422 404 437     Encounter Date: 02/12/25   ECG 12 lead (Clinic Performed)   Result Value    Ventricular Rate 67    Atrial Rate 67    MS Interval 178    QRS Duration 86    QT Interval 400    QTC Calculation(Bazett) 422    P Axis 81    R Axis 88    T Axis 72    QRS Count 11    Q Onset 224    P Onset 135    P Offset 190    T Offset 424    QTC Fredericia 415    Narrative    Normal sinus rhythm  Normal ECG  When compared with ECG of 02-JAN-2025 10:49,  Aberrant conduction is no longer Present  Questionable change in QRS axis     Echocardiogram:   Recent Labs     11/29/24  1100   EF 58   LVIDD 3.84   RV 22.4   RVFRWALLPKSP 15.10   TAPSE 2.3   Transthoracic Echo (TTE) Complete 11/29/2024    Narrative  [facility], [address] [address]  Tel [phone] and Fax [phone]    TRANSTHORACIC ECHOCARDIOGRAM REPORT      Patient Name:       SALVATORE MÉNDEZ  Reading Physician:    [id number] Marychuy Padgett MD  Study Date:         11/29/2024          Ordering Provider:    [id number] MARYCHUY PADGETT  MRN/PID:            [id number]             Fellow:  Accession#:         [id number]        Nurse:  Date of Birth/Age:  1955 / 69      Sonographer:          Ching Peters RDCS  years  Gender assigned at  F                   Additional Staff:     Pradeep KOEHLER  Birth:  Height:             157.48 cm           Admit Date:  Weight:             47.63 kg            Admission Status:     Outpatient  BSA / BMI:          1.45 m2 / 19.20     Encounter#:           [id number]  kg/m2  Blood Pressure:     151/85 mmHg         Department Location:  [facility]    Study Type:    TRANSTHORACIC ECHO (TTE) COMPLETE  Diagnosis/ICD: Supraventricular tachycardia-I47.1  Indication:    MVP  CPT Code:      Echo Complete w Full Doppler-[id number]    Patient History:  Pertinent History: HTN, Palpitations and Dyspnea. SVT.    Study Detail: The following Echo studies were performed: 2D, M-Mode, Doppler and  color flow. Technically challenging study due to poor acoustic  windows and body habitus. Agitated saline used as a contrast agent  for intraseptal flow evaluation. The patient was awake.      PHYSICIAN INTERPRETATION:  Left Ventricle: The left ventricular systolic function is normal, with a visually estimated ejection fraction of 55-60%. There are no regional left ventricular wall motion abnormalities. The left ventricular cavity size is normal. There is normal septal and normal posterior left ventricular wall thickness. There is left ventricular concentric remodeling. Spectral Doppler shows a normal pattern of left ventricular diastolic filling.  Left Atrium: The left atrium is normal in size. A bubble study using agitated saline was performed. Bubble study is negative. There is evidence of an atrial septal aneurysm.  Right Ventricle: The right ventricle is normal in size. There is normal right ventricular global systolic function.  Right Atrium: The right atrium is normal in size.  Aortic Valve: The aortic valve is trileaflet. There is trace aortic valve regurgitation. The  peak instantaneous gradient of the aortic valve is 8 mmHg.  Mitral Valve: The mitral valve is mild to moderately thickened. There is mitral valve prolapse. There is mild mitral valve prolapse of the anterior leaflet. There is mild mitral valve regurgitation.  Tricuspid Valve: The tricuspid valve is structurally normal. There is mild tricuspid regurgitation. The Doppler estimated RVSP is within normal limits at 22.4 mmHg.  Pulmonic Valve: The pulmonic valve is not well visualized. There is mild pulmonic valve regurgitation.  Pericardium: There is no pericardial effusion noted.  Aorta: The aortic root is normal.  Systemic Veins: The inferior vena cava appears normal in size, with IVC inspiratory collapse greater than 50%.  In comparison to the previous echocardiogram(s): Compared with study dated 12/31/2015, no significant change.      CONCLUSIONS:  1. The left ventricular systolic function is normal, with a visually estimated ejection fraction of 55-60%.  2. There is normal right ventricular global systolic function.  3. Right ventricular systolic pressure is within normal limits.  4. There is mild mitral valve prolapse. There is mild mitral valve regurgitation.    QUANTITATIVE DATA SUMMARY:    2D MEASUREMENTS:          Normal Ranges:  Ao Root d:       2.50 cm  (2.0-3.7cm)  LAs:             2.90 cm  (2.7-4.0cm)  IVSd:            0.90 cm  (0.6-1.1cm)  LVPWd:           0.85 cm  (0.6-1.1cm)  LVIDd:           3.84 cm  (3.9-5.9cm)  LVIDs:           2.63 cm  LV Mass Index:   68 g/m2  LVEDV Index:     49 ml/m2  LV % FS          31.5 %      LA VOLUME:                    Normal Ranges:  LA Vol A4C:        36.2 ml    (22+/-6mL/m2)  LA Vol A2C:        36.2 ml  LA Vol BP:         36.2 ml  LA Vol Index A4C:  24.9ml/m2  LA Vol Index A2C:  24.9 ml/m2  LA Vol Index BP:   24.9 ml/m2  LA Area A4C:       14.0 cm2  LA Area A2C:       14.0 cm2  LA Major Axis A4C: 4.6 cm  LA Major Axis A2C: 4.6 cm  LA Volume Index:   21.0 ml/m2      RA  VOLUME BY A/L METHOD:            Normal Ranges:  RA Vol A4C:              18.0 ml    (8.3-19.5ml)  RA Vol Index A4C:        12.4 ml/m2  RA Area A4C:             9.2 cm2  RA Major Axis A4C:       4.0 cm      M-MODE MEASUREMENTS:         Normal Ranges:  Ao Root:             2.30 cm (2.0-3.7cm)  LAs:                 2.80 cm (2.7-4.0cm)      AORTA MEASUREMENTS:         Normal Ranges:  Asc Ao, d:          2.90 cm (2.1-3.4cm)      LV SYSTOLIC FUNCTION BY 2D PLANIMETRY (MOD):  Normal Ranges:  EF-A4C View:    68 % (>=55%)  EF-Visual:      58 %  LV EF Reported: 58 %      LV DIASTOLIC FUNCTION:            Normal Ranges:  MV Peak E:             0.87 m/s   (0.7-1.2 m/s)  MV Peak A:             0.86 m/s   (0.42-0.7 m/s)  E/A Ratio:             1.01       (1.0-2.2)  MV e'                  0.103 m/s  (>8.0)  MV lateral e'          0.11 m/s  MV medial e'           0.10 m/s  E/e' Ratio:            8.37       (<8.0)  PulmV Sys Deny:         43.00 cm/s  PulmV Shi Deny:        65.30 cm/s  PulmV S/D Deny:         0.70  PulmV A Revs Deny:      43.00 cm/s  PulmV A Revs Dur:      85.00 msec      MITRAL VALVE:          Normal Ranges:  MV DT:        183 msec (150-240msec)      AORTIC VALVE:           Normal Ranges:  AoV Vmax:      1.41 m/s (<=1.7m/s)  AoV Peak P.0 mmHg (<20mmHg)  LVOT Max Deny:  1.15 m/s (<=1.1m/s)  LVOT VTI:      22.50 cm  LVOT Diameter: 1.90 cm  (1.8-2.4cm)  AoV Area,Vmax: 2.31 cm2 (2.5-4.5cm2)      RIGHT VENTRICLE:  RV Basal 2.90 cm  RV Mid   2.10 cm  RV Major 5.4 cm  TAPSE:   22.9 mm  RV s'    0.15 m/s      TRICUSPID VALVE/RVSP:          Normal Ranges:  Peak TR Velocity:     2.20 m/s  RV Syst Pressure:     22 mmHg  (< 30mmHg)  IVC Diam:             1.20 cm      PULMONIC VALVE:             Normal Ranges:  RVOT diam: systole 1.53 cm  (1.7-2.3cm)  PV Max Deny:        0.8 m/s  (0.6-0.9m/s)  PV Max P.9 mmHg  PV Mean P.0 mmHg  PV VTI:            16.10 cm      Pulmonary Veins:  PulmV A Revs Dur: 85.00  msec  PulmV A Revs Deny: 43.00 cm/s  PulmV Shi Deny:   65.30 cm/s  PulmV S/D Deny:    0.70  PulmV Sys Deny:    43.00 cm/s      [id number] Marychuy Padgett MD  Electronically signed on 12/2/2024 at 2:32:10 PM        ** Final **    Coronary Angiography: No results found for this or any previous visit from the past 1800 days.    Right Heart Cath: No results found for this or any previous visit from the past 1800 days.    Cardiac Scoring: No results found for this or any previous visit from the past 1800 days.    Cardiac MRI:   MR cardiac w and wo IV contrast w regadenoson stress for MORPH FUNCT and valve DZ 01/02/2025    Narrative  Interpreted By:  Mirza Mosqueda and Glidden Michael  STUDY:  MR CARDIAC W AND WO IV CONTRAST W REGADENOSON STRESS FOR MORPH/FUNCT  AND VALVE DZ;  1/2/2025 10:32 am    INDICATION:  Signs/Symptoms:braswell, mitral valve prolaspe, ischemic eval.    COMPARISON:  None.    ACCESSION NUMBER(S):  [id number]    ORDERING CLINICIAN:  MARYCHUY PADGETT    TECHNIQUE:  Siemens 1.5  Angelica MRI scanner.  Turbo spin echo and balanced steady state free precession (bSSFP)  imaging for anatomic definition. Dynamic cine bSSFP for cardiac  chamber and wall-motion analysis, and valvular analysis. Flow  quantification sequences for hemodynamics. Delayed gadolinium  enhancement analysis after injection of gadolinium-chelate    HT-158 cm; WT-48 kg; BSA-1.45 m2    FINDINGS:  CARDIAC CHAMBERS:  Normal atrioventricular and ventriculoarterial concordance    LEFT ATRIUM:  Normal size (COLT - 39.43 ml/m2).    RIGHT ATRIUM:  Normal size (RA area max 4ch - 11.21 cm2)    INTERATRIAL SEPTUM:  Intact.    LEFT VENTRICLE:  1. Normal LV size (EDVi 74 ml/m2) and normal systolic function.  Quantitative LVEF 66 %.  2. No regional wall motion abnormalities.  3. Normal LV wall thickness and normal LV indexed mass (LVMi 47 g/m2).  4. T2 maps were not performed.  5. ECV could not be calculated as T1 mapping was not performed.  6. No evidence of LV  "thrombus.  7. Delayed-enhancement imaging reveals uniformly \"nulled\" myocardium,  signifying that there has been no prior ischemic myocardial damage.  There is also no definite evidence of interstitial fibrosis to  suggest an infiltrative process.    Quantitative left ventricular functional values are as follows:  EDV = 107.24 cc; EDVi = 73.89 cc/m2  ESV = 36.64 cc; ESVi = 25.24 cc/m2  Absolute Cardiac Output = 4.24 l/min.; COi = 2.92 l/min/m2  LV mass = 68.78 gm; LVMi = 47.39 gm/m2  Stroke volume = 70.60 cc; SVi = 48.64 cc/m2  LVEF = 66 %    LV septal wall thickness (anterobasal): 0.62 cm  LV postero-inferior wall thickness: 0.59 cm  LVEDD: 4.53 cm  LVESD: 2.04 cm    STRESS PERFUSION:  Following administration of regadenoson, during first pass perfusion,  there is uniform contrast wash-in for all visualized myocardial  segments.    RIGHT VENTRICLE:  1. Normal RV size (EDVi 78 ml/m2) and normal systolic function.  Quantitative RVEF 63 %.  2. No regional wall motion abnormalities.  3. No abnormal delayed enhancement in the myocardium.    Quantitative right ventricular functional values are as follows:  RVEDV = 112.77 ml; RVEDVi = 77.69 ml/m2  RVESV = 41.60 ml; RVESVi = 28.66 ml/m2  RVSV = 71.16 ml; RVSVi = 49.03 ml/m2  RVEF = 63.00 %  RVCO = 4.27 l/min; RVCI = 2.94 l/min/m2    INTERVENTRICULAR SEPTUM:  Intact.    AORTIC VALVE:  The aortic valve is trileaflet.  There is quantitatively trivial aortic regurgitation.  Flow quantification through the ascending aorta:  Forward volume = 88.78 cc/beat  Reverse volume = -1.17 cc/beat  Net forward volume = 87.61 cc/beat  Aortic regurgitant fraction = 1 %    MITRAL VALVE:  The mitral valve leaflets appear normal.  There is no mitral regurgitation.  Integrating LV volumetric and aortic flow quantification data reveals:  Quantitative mitral regurgitant volume = 0.00 cc/beat  Quantitative mitral regurgitant fraction = 0 %    TRICUSPID VALVE:  There is qualitatively no tricuspid " regurgitation.    PULMONARY VALVE:  Not assessed.    PERICARDIUM:  The pericardium is normal.  There is no pericardial effusion.    THORACIC AORTA:  The thoracic aorta appears normal in course and contour. The aortic  root is normal in size. There is no evidence for acute aortic  pathology. The arch vessel branching pattern is  normal.   All the  arch branch vessels appear widely patent in their proximal portions.    AORTIC ROOT DIMENSIONS:  Annulus: 2.3 cm  Aortic root(sinus of valsalva): 2.5 cm  Sinotubular junction: 1.6 cm    PULMONARY ARTERIES:  The central pulmonary arteries appear normal (MPA-1.9 cm, RPA-1.6 cm,  LPA-1.4 cm).    SYSTEMIC AND PULMONARY VEINS:  Normal systemic venous and pulmonary venous return.  The SVC is of normal caliber. IVC appears normal  Normal pulmonary venous anatomy.    CHEST:  The chest wall is normal.  Limited imaging through the lungs reveals no gross abnormalities. No  pleural effusion.    UPPER ABDOMEN:  Limited imaging through the upper abdomen reveals no abnormalities of  the visualized organs.    Impression  1. Technically limited study due to poor image quality.  2. No definitive evidence of inducible myocardial ischemia (stress  perfusion) although poor image quality precludes definitive  interpretation  3. Mild bowing of the anterior mitral leaflet without ricco prolapse.  Quantitatively no mitral regurgitation.  4. Normal LV size (EDVi 74 ml/m2) and normal systolic function.  Quantitative LVEF 66 %.  5. No evidence of myocardial fibrosis, infiltration or infarction  based on LGE imaging.  6. Normal RV size (EDVi 78 ml/m2) and normal systolic function.  Quantitative RVEF 63 %.      MACRO:  None    Signed by: Mirza Mosqueda 1/2/2025 1:24 PM  Dictation workstation:   [id number]    Nuclear:No results found for this or any previous visit from the past 1800 days.    Metabolic Stress: No results found for this or any previous visit from the past 1800 days.        The  10-year ASCVD risk score (Nitesh VORA, et al., 2019) is: 13.4%    Values used to calculate the score:      Age: 69 years      Sex: Female      Is Non- : No      Diabetic: No      Tobacco smoker: No      Systolic Blood Pressure: 164 mmHg      Is BP treated: Yes      HDL Cholesterol: 84.4 mg/dL      Total Cholesterol: 222 mg/dL     Assessment/Plan:   1. SVT (supraventricular tachycardia)        2. Mitral valve prolapse        3. Primary hypertension        4. Palpitations        5. Nonrheumatic mitral valve regurgitation           Olga Lidia Valentin 69 y.o. female with hx of reported hx of SVT, HTN and mitral valve prolapse/mild MR in 2015 referred for palpitations. Stress CMR unremarkable. Echo with mild MR with mitral valve prolapse. Zio with short SVT episodes, longest 18 beats (total 10 episodes), symptomatic. <1% PVCs and PACs. She had insomnia from coreg then switched to diltiazem  Asymptomatic   BP is high     Plan  She will start monitoring her BP, and call us if her BP is high, in that case will increase valsartan to 320 mg  Continue diltiazem 240 mg every day   Continue Flecanide 100 mg bid prn  Repeat echo after 3 yrs       # Cardiovascular Health Maintenance  - Physical Activity: Encourage 10-15K steps per day  - Healthy Diet: Avoid high fat and high sodium foods (processed meats / fast foods). Consider a mediterranean or DASH diet, emphasizing vegetables, fruits, whole grains, lean proteins  - Avoidance of smoking and smoke exposure    Time Spent: I spent 30 minutes reviewing medical testing, obtaining medical history and counselling and educating on diagnosis and documenting clinical encounter.         ____________________________________________________________  Madelin Rodriguez MD   of Medicine  Senior Attending Physician   Division of Cardiovascular Medicine   [facility] [place]  [facility]         [1]   Past Medical  History:  Diagnosis Date    Abnormal results of kidney function studies 06/21/2013    Renal function test abnormal    Asymptomatic menopausal state 03/09/2015    History of menopause    Asymptomatic menopausal state 03/12/2018    Postmenopausal status    Encounter for other preprocedural examination 03/09/2015    Preop examination    Encounter for screening for other viral diseases 10/16/2018    Need for hepatitis C screening test    Insomnia, unspecified 09/24/2015    Persistent insomnia    Other conditions influencing health status     No significant past medical history    Other long term (current) drug therapy 10/16/2018    Long term use of drug    Other specified noninflammatory disorders of vulva and perineum 08/17/2016    Vulvar lesion    Other tear of medial meniscus, current injury, right knee, subsequent encounter 10/10/2018    Tear of medial meniscus of right knee, current, unspecified tear type, subsequent encounter    Pain in right knee 11/02/2018    Right knee pain, unspecified chronicity    Peripheral tear of medial meniscus, current injury, right knee, initial encounter 10/26/2018    Peripheral tear of medial meniscus of right knee, unspecified whether old or current tear, initial encounter    Personal history of other diseases of the female genital tract 08/17/2016    History of postmenopausal bleeding    Personal history of other drug therapy 09/26/2017    History of influenza vaccination    Personal history of other endocrine, nutritional and metabolic disease 10/02/2014    History of hypothyroidism    Personal history of other medical treatment 03/12/2018    History of screening mammography    Personal history of other specified conditions 06/25/2013    History of urinary frequency    Personal history of other specified conditions 09/17/2013    History of abnormal weight loss    Personal history of urinary (tract) infections 06/25/2013    History of urinary tract infection    Postmenopausal  atrophic vaginitis 09/26/2017    Atrophic vaginitis   [2]   Past Surgical History:  Procedure Laterality Date    APPENDECTOMY  03/09/2015    Appendectomy    HYSTERECTOMY  03/09/2015    Hysterectomy    OTHER SURGICAL HISTORY  08/30/2022    Knee surgery   [3]   Family History  Problem Relation Name Age of Onset    Endometrial cancer Mother      Breast cancer Sister     [4] No Known Allergies

## 2025-07-21 DIAGNOSIS — I10 HYPERTENSION, UNSPECIFIED TYPE: ICD-10-CM

## 2025-07-21 RX ORDER — VALSARTAN 160 MG/1
160 TABLET ORAL DAILY
Qty: 90 TABLET | Refills: 0 | Status: SHIPPED | OUTPATIENT
Start: 2025-07-21

## 2025-08-07 ENCOUNTER — APPOINTMENT (OUTPATIENT)
Dept: PRIMARY CARE | Facility: CLINIC | Age: 70
End: 2025-08-07
Payer: MEDICARE

## 2025-08-07 VITALS
OXYGEN SATURATION: 99 % | BODY MASS INDEX: 19.57 KG/M2 | TEMPERATURE: 97.4 F | DIASTOLIC BLOOD PRESSURE: 70 MMHG | WEIGHT: 107 LBS | HEART RATE: 62 BPM | SYSTOLIC BLOOD PRESSURE: 150 MMHG

## 2025-08-07 DIAGNOSIS — I10 HYPERTENSION, UNSPECIFIED TYPE: ICD-10-CM

## 2025-08-07 DIAGNOSIS — I34.1 MITRAL VALVE PROLAPSE: ICD-10-CM

## 2025-08-07 DIAGNOSIS — I47.10 SVT (SUPRAVENTRICULAR TACHYCARDIA): Primary | ICD-10-CM

## 2025-08-07 PROCEDURE — 1160F RVW MEDS BY RX/DR IN RCRD: CPT | Performed by: INTERNAL MEDICINE

## 2025-08-07 PROCEDURE — 3078F DIAST BP <80 MM HG: CPT | Performed by: INTERNAL MEDICINE

## 2025-08-07 PROCEDURE — 99214 OFFICE O/P EST MOD 30 MIN: CPT | Performed by: INTERNAL MEDICINE

## 2025-08-07 PROCEDURE — 3077F SYST BP >= 140 MM HG: CPT | Performed by: INTERNAL MEDICINE

## 2025-08-07 PROCEDURE — G2211 COMPLEX E/M VISIT ADD ON: HCPCS | Performed by: INTERNAL MEDICINE

## 2025-08-07 PROCEDURE — 1159F MED LIST DOCD IN RCRD: CPT | Performed by: INTERNAL MEDICINE

## 2025-08-07 RX ORDER — VALSARTAN 320 MG/1
320 TABLET ORAL DAILY
Qty: 90 TABLET | Refills: 0 | Status: SHIPPED | OUTPATIENT
Start: 2025-08-07

## 2025-08-07 ASSESSMENT — ENCOUNTER SYMPTOMS
HEADACHES: 0
PALPITATIONS: 0
HYPERTENSION: 1
SINUS PAIN: 0
FEVER: 0
UNEXPECTED WEIGHT CHANGE: 0
DIZZINESS: 0
ARTHRALGIAS: 0
BLOOD IN STOOL: 0
DIFFICULTY URINATING: 0
DIARRHEA: 0
WHEEZING: 0
SORE THROAT: 0
FATIGUE: 0
BRUISES/BLEEDS EASILY: 0
COUGH: 0
ABDOMINAL PAIN: 0

## 2025-08-07 NOTE — PROGRESS NOTES
Subjective   Patient ID: Olga Lidia Valentin is a 69 y.o. female who presents for Hypertension.    - Recent blood work cardiology workup reviewed with patient  -120/80  Increased valsartan to take 320 mg in the evening continue with Cardizem 240 in a.m.  -- Status post SVT patient's sinus no recurrence continue monitoring counseled about diet exercise sleep hygiene avoid caffeine avoid any alcohol  -Hypertension uncontrolled patient now on diltiazem to 240 mg daily continue with valsartan 160 mg daily     - Hypercholesterolemia maximize medical management continue low-fat diet  -- Screening for colon cancer obtained November 2020 2 repeat in November 2027 5 years     -- Mitral valve prolapse, compensated no chest pain repeat 2D echo in 3 years  - COPD stable no recurrence no shortness of breath no palpitation no leg swelling no orthopnea  --Hypertension improved continue with valsartan continue low-salt diet  -  Irritable bowel syndrome continue with dicyclomine as needed high-fiber diet symptoms are controlled continue with high-fiber diet  Recheck patient blood pressure in 4 weeks      Hypertension  Pertinent negatives include no chest pain, headaches or palpitations.          Review of Systems   Constitutional:  Negative for fatigue, fever and unexpected weight change.   HENT:  Negative for congestion, ear discharge, ear pain, mouth sores, sinus pain and sore throat.    Eyes:  Negative for visual disturbance.   Respiratory:  Negative for cough and wheezing.    Cardiovascular:  Negative for chest pain, palpitations and leg swelling.   Gastrointestinal:  Negative for abdominal pain, blood in stool and diarrhea.   Genitourinary:  Negative for difficulty urinating.   Musculoskeletal:  Negative for arthralgias.   Skin:  Negative for rash.   Neurological:  Negative for dizziness and headaches.   Hematological:  Does not bruise/bleed easily.   Psychiatric/Behavioral:  Negative for behavioral problems.    All other  "systems reviewed and are negative.      Objective   No results found for: \"HGBA1C\"   /70   Pulse 62   Temp 36.3 °C (97.4 °F)   Wt 48.5 kg (107 lb)   SpO2 99%   BMI 19.57 kg/m²     Physical Exam  Vitals and nursing note reviewed.   Constitutional:       Appearance: Normal appearance.   HENT:      Head: Normocephalic.      Nose: Nose normal.     Eyes:      Conjunctiva/sclera: Conjunctivae normal.      Pupils: Pupils are equal, round, and reactive to light.       Cardiovascular:      Rate and Rhythm: Regular rhythm.   Pulmonary:      Effort: Pulmonary effort is normal.      Breath sounds: Normal breath sounds.   Abdominal:      General: Abdomen is flat.      Palpations: Abdomen is soft.     Musculoskeletal:      Cervical back: Neck supple.     Skin:     General: Skin is warm.     Neurological:      General: No focal deficit present.      Mental Status: She is oriented to person, place, and time.     Psychiatric:         Mood and Affect: Mood normal.         Assessment/Plan   Olga Lidia was seen today for hypertension.  Diagnoses and all orders for this visit:  SVT (supraventricular tachycardia) (Primary)  Hypertension, unspecified type  -     valsartan (Diovan) 320 mg tablet; Take 1 tablet (320 mg) by mouth once daily.  Mitral valve prolapse  Other orders  -     Follow Up In Primary Care - Established  -     Follow Up In Primary Care - Established; Future    Recent blood work cardiology workup reviewed with patient  -120/80  Increased valsartan to take 320 mg in the evening continue with Cardizem 240 in a.m.  -- Status post SVT patient's sinus no recurrence continue monitoring counseled about diet exercise sleep hygiene avoid caffeine avoid any alcohol  -Hypertension uncontrolled patient now on diltiazem to 240 mg daily continue with valsartan 160 mg daily     - Hypercholesterolemia maximize medical management continue low-fat diet  -- Screening for colon cancer obtained November 2020 2 repeat in November 2027 " 5 years     -- Mitral valve prolapse, compensated no chest pain repeat 2D echo in 3 years  - COPD stable no recurrence no shortness of breath no palpitation no leg swelling no orthopnea  --Hypertension improved continue with valsartan continue low-salt diet  -  Irritable bowel syndrome continue with dicyclomine as needed high-fiber diet symptoms are controlled continue with high-fiber diet  Recheck patient blood pressure in 4 weeks

## 2025-08-25 ENCOUNTER — HOSPITAL ENCOUNTER (OUTPATIENT)
Dept: RADIOLOGY | Facility: HOSPITAL | Age: 70
Discharge: HOME | End: 2025-08-25
Payer: MEDICARE

## 2025-08-25 ENCOUNTER — OFFICE VISIT (OUTPATIENT)
Dept: PRIMARY CARE | Facility: CLINIC | Age: 70
End: 2025-08-25
Payer: MEDICARE

## 2025-08-25 VITALS
OXYGEN SATURATION: 98 % | WEIGHT: 108.2 LBS | SYSTOLIC BLOOD PRESSURE: 120 MMHG | HEART RATE: 65 BPM | TEMPERATURE: 97.6 F | DIASTOLIC BLOOD PRESSURE: 80 MMHG | BODY MASS INDEX: 19.79 KG/M2

## 2025-08-25 DIAGNOSIS — M25.532 PAIN AND SWELLING OF LEFT WRIST: ICD-10-CM

## 2025-08-25 DIAGNOSIS — M25.432 PAIN AND SWELLING OF LEFT WRIST: ICD-10-CM

## 2025-08-25 DIAGNOSIS — I47.10 SVT (SUPRAVENTRICULAR TACHYCARDIA): ICD-10-CM

## 2025-08-25 DIAGNOSIS — M25.532 PAIN AND SWELLING OF LEFT WRIST: Primary | ICD-10-CM

## 2025-08-25 DIAGNOSIS — I34.1 MITRAL VALVE PROLAPSE: ICD-10-CM

## 2025-08-25 DIAGNOSIS — I10 PRIMARY HYPERTENSION: ICD-10-CM

## 2025-08-25 DIAGNOSIS — M25.432 PAIN AND SWELLING OF LEFT WRIST: Primary | ICD-10-CM

## 2025-08-25 DIAGNOSIS — S52.502A CLOSED FRACTURE OF DISTAL END OF LEFT RADIUS, UNSPECIFIED FRACTURE MORPHOLOGY, INITIAL ENCOUNTER: ICD-10-CM

## 2025-08-25 PROCEDURE — 73090 X-RAY EXAM OF FOREARM: CPT | Mod: LT

## 2025-08-25 PROCEDURE — 73110 X-RAY EXAM OF WRIST: CPT | Mod: LEFT SIDE | Performed by: RADIOLOGY

## 2025-08-25 PROCEDURE — 73110 X-RAY EXAM OF WRIST: CPT | Mod: LT

## 2025-08-25 PROCEDURE — 1160F RVW MEDS BY RX/DR IN RCRD: CPT | Performed by: INTERNAL MEDICINE

## 2025-08-25 PROCEDURE — 99214 OFFICE O/P EST MOD 30 MIN: CPT | Performed by: INTERNAL MEDICINE

## 2025-08-25 PROCEDURE — 73090 X-RAY EXAM OF FOREARM: CPT | Mod: LEFT SIDE | Performed by: RADIOLOGY

## 2025-08-25 PROCEDURE — 3074F SYST BP LT 130 MM HG: CPT | Performed by: INTERNAL MEDICINE

## 2025-08-25 PROCEDURE — 1159F MED LIST DOCD IN RCRD: CPT | Performed by: INTERNAL MEDICINE

## 2025-08-25 PROCEDURE — 3078F DIAST BP <80 MM HG: CPT | Performed by: INTERNAL MEDICINE

## 2025-08-25 PROCEDURE — G2211 COMPLEX E/M VISIT ADD ON: HCPCS | Performed by: INTERNAL MEDICINE

## 2025-08-25 ASSESSMENT — ENCOUNTER SYMPTOMS
FEVER: 0
SINUS PAIN: 0
SORE THROAT: 0
FATIGUE: 0
JOINT SWELLING: 1
ARTHRALGIAS: 1
UNEXPECTED WEIGHT CHANGE: 0
COUGH: 0
PALPITATIONS: 0
BRUISES/BLEEDS EASILY: 0
ABDOMINAL PAIN: 0
DIARRHEA: 0
WHEEZING: 0
HEADACHES: 0
DIFFICULTY URINATING: 0
BLOOD IN STOOL: 0
DIZZINESS: 0

## 2025-09-04 ENCOUNTER — OFFICE VISIT (OUTPATIENT)
Dept: ORTHOPEDIC SURGERY | Facility: CLINIC | Age: 70
End: 2025-09-04
Payer: MEDICARE

## 2025-09-04 DIAGNOSIS — M65.30 TRIGGER FINGER, ACQUIRED: Primary | ICD-10-CM

## 2025-09-04 DIAGNOSIS — S62.102A LEFT WRIST FRACTURE, CLOSED, INITIAL ENCOUNTER: ICD-10-CM

## 2025-09-04 PROCEDURE — 99203 OFFICE O/P NEW LOW 30 MIN: CPT | Performed by: ORTHOPAEDIC SURGERY

## 2025-09-04 PROCEDURE — 1159F MED LIST DOCD IN RCRD: CPT | Performed by: ORTHOPAEDIC SURGERY

## 2025-09-04 PROCEDURE — 1125F AMNT PAIN NOTED PAIN PRSNT: CPT | Performed by: ORTHOPAEDIC SURGERY

## 2025-09-04 PROCEDURE — 99202 OFFICE O/P NEW SF 15 MIN: CPT | Performed by: ORTHOPAEDIC SURGERY

## 2025-09-04 ASSESSMENT — PAIN - FUNCTIONAL ASSESSMENT: PAIN_FUNCTIONAL_ASSESSMENT: 0-10

## 2025-09-04 ASSESSMENT — PAIN SCALES - GENERAL: PAINLEVEL_OUTOF10: 7

## 2025-09-16 ENCOUNTER — APPOINTMENT (OUTPATIENT)
Dept: PRIMARY CARE | Facility: CLINIC | Age: 70
End: 2025-09-16
Payer: MEDICARE